# Patient Record
Sex: FEMALE | Race: WHITE | NOT HISPANIC OR LATINO | Employment: FULL TIME | URBAN - METROPOLITAN AREA
[De-identification: names, ages, dates, MRNs, and addresses within clinical notes are randomized per-mention and may not be internally consistent; named-entity substitution may affect disease eponyms.]

---

## 2023-07-28 LAB
ALANINE AMINOTRANSFERASE (SGPT) (U/L) IN SER/PLAS: 16 U/L (ref 7–45)
ALBUMIN (G/DL) IN SER/PLAS: 4.1 G/DL (ref 3.4–5)
ALKALINE PHOSPHATASE (U/L) IN SER/PLAS: 96 U/L (ref 33–110)
ANION GAP IN SER/PLAS: 12 MMOL/L (ref 10–20)
ASPARTATE AMINOTRANSFERASE (SGOT) (U/L) IN SER/PLAS: 13 U/L (ref 9–39)
BILIRUBIN TOTAL (MG/DL) IN SER/PLAS: 0.4 MG/DL (ref 0–1.2)
CALCIUM (MG/DL) IN SER/PLAS: 9.6 MG/DL (ref 8.6–10.3)
CARBON DIOXIDE, TOTAL (MMOL/L) IN SER/PLAS: 26 MMOL/L (ref 21–32)
CHLORIDE (MMOL/L) IN SER/PLAS: 103 MMOL/L (ref 98–107)
CHOLESTEROL (MG/DL) IN SER/PLAS: 168 MG/DL (ref 0–199)
CHOLESTEROL IN HDL (MG/DL) IN SER/PLAS: 40.5 MG/DL
CHOLESTEROL/HDL RATIO: 4.1
CREATININE (MG/DL) IN SER/PLAS: 0.79 MG/DL (ref 0.5–1.05)
GFR FEMALE: >90 ML/MIN/1.73M2
GLUCOSE (MG/DL) IN SER/PLAS: 97 MG/DL (ref 74–99)
LDL: 111 MG/DL (ref 0–99)
POTASSIUM (MMOL/L) IN SER/PLAS: 3.8 MMOL/L (ref 3.5–5.3)
PROTEIN TOTAL: 7.3 G/DL (ref 6.4–8.2)
SODIUM (MMOL/L) IN SER/PLAS: 137 MMOL/L (ref 136–145)
THYROTROPIN (MIU/L) IN SER/PLAS BY DETECTION LIMIT <= 0.05 MIU/L: 2.58 MIU/L (ref 0.44–3.98)
TRIGLYCERIDE (MG/DL) IN SER/PLAS: 84 MG/DL (ref 0–149)
UREA NITROGEN (MG/DL) IN SER/PLAS: 13 MG/DL (ref 6–23)
VLDL: 17 MG/DL (ref 0–40)

## 2023-07-29 LAB
ESTIMATED AVERAGE GLUCOSE FOR HBA1C: 108 MG/DL
HEMOGLOBIN A1C/HEMOGLOBIN TOTAL IN BLOOD: 5.4 %
INSULIN: 22 UIU/ML (ref 3–25)

## 2023-12-08 ENCOUNTER — TELEPHONE (OUTPATIENT)
Dept: ENDOCRINOLOGY | Facility: CLINIC | Age: 48
End: 2023-12-08
Payer: COMMERCIAL

## 2023-12-08 DIAGNOSIS — E03.9 HYPOTHYROIDISM, UNSPECIFIED TYPE: Primary | ICD-10-CM

## 2023-12-08 NOTE — TELEPHONE ENCOUNTER
Patient has a yearly thyroid appt. 12-15-23.  Please let me know what labs if any you want done.

## 2023-12-15 ENCOUNTER — APPOINTMENT (OUTPATIENT)
Dept: ENDOCRINOLOGY | Facility: CLINIC | Age: 48
End: 2023-12-15
Payer: COMMERCIAL

## 2024-05-07 ENCOUNTER — LAB (OUTPATIENT)
Dept: LAB | Facility: LAB | Age: 49
End: 2024-05-07
Payer: COMMERCIAL

## 2024-05-07 DIAGNOSIS — E03.9 HYPOTHYROIDISM, UNSPECIFIED TYPE: ICD-10-CM

## 2024-05-07 LAB — TSH SERPL-ACNC: 2.63 MIU/L (ref 0.44–3.98)

## 2024-05-07 PROCEDURE — 36415 COLL VENOUS BLD VENIPUNCTURE: CPT

## 2024-05-07 PROCEDURE — 84443 ASSAY THYROID STIM HORMONE: CPT

## 2024-05-08 ENCOUNTER — APPOINTMENT (OUTPATIENT)
Dept: ENDOCRINOLOGY | Facility: CLINIC | Age: 49
End: 2024-05-08
Payer: COMMERCIAL

## 2024-05-08 ENCOUNTER — OFFICE VISIT (OUTPATIENT)
Dept: ENDOCRINOLOGY | Facility: CLINIC | Age: 49
End: 2024-05-08
Payer: COMMERCIAL

## 2024-05-08 DIAGNOSIS — E55.9 VITAMIN D DEFICIENCY: ICD-10-CM

## 2024-05-08 DIAGNOSIS — E03.9 HYPOTHYROIDISM, UNSPECIFIED TYPE: Primary | ICD-10-CM

## 2024-05-08 RX ORDER — ERGOCALCIFEROL 1.25 MG/1
50000 CAPSULE ORAL
Qty: 12 CAPSULE | Refills: 2 | Status: SHIPPED | OUTPATIENT
Start: 2024-05-12 | End: 2025-01-19

## 2024-05-08 RX ORDER — LOSARTAN POTASSIUM 100 MG/1
TABLET ORAL
COMMUNITY

## 2024-05-08 RX ORDER — FUROSEMIDE 20 MG/1
1 TABLET ORAL DAILY
COMMUNITY
Start: 2022-07-08 | End: 2024-05-08 | Stop reason: ALTCHOICE

## 2024-05-08 RX ORDER — LEVOTHYROXINE SODIUM 50 UG/1
1 TABLET ORAL DAILY
COMMUNITY
Start: 2019-10-28 | End: 2024-05-08 | Stop reason: ALTCHOICE

## 2024-05-08 ASSESSMENT — ENCOUNTER SYMPTOMS
FREQUENCY: 0
SORE THROAT: 0
PHOTOPHOBIA: 0
TREMORS: 0
ABDOMINAL PAIN: 0
ARTHRALGIAS: 0
SHORTNESS OF BREATH: 0
SLEEP DISTURBANCE: 0
TROUBLE SWALLOWING: 0
DIARRHEA: 0
NERVOUS/ANXIOUS: 0
PALPITATIONS: 0
NAUSEA: 0
LIGHT-HEADEDNESS: 0
ABDOMINAL DISTENTION: 0
VOICE CHANGE: 0
FATIGUE: 1
DYSURIA: 0
HEADACHES: 0
EYE ITCHING: 0
CHEST TIGHTNESS: 0
CONSTIPATION: 0
AGITATION: 0
VOMITING: 0

## 2024-05-08 NOTE — PROGRESS NOTES
Subjective   Patient ID: Patricia Astudillo is a 48 y.o. female who presents for Hypothyroidism ( thyroid. Dx: 2018 /Taking thyroid med; correctly - levothyroxine 50mcg every day /[facility]: mother /).  Lab Results   Component Value Date    TSH 2.63 05/07/2024      HPI  See AP     Review of Systems   Constitutional:  Positive for fatigue.   HENT:  Negative for sore throat, trouble swallowing and voice change.    Eyes:  Negative for photophobia, itching and visual disturbance.   Respiratory:  Negative for chest tightness and shortness of breath.    Cardiovascular:  Negative for chest pain and palpitations.   Gastrointestinal:  Negative for abdominal distention, abdominal pain, constipation, diarrhea, nausea and vomiting.   Endocrine: Negative for cold intolerance, heat intolerance and polyuria.   Genitourinary:  Negative for dysuria and frequency.   Musculoskeletal:  Negative for arthralgias.   Skin:  Negative for pallor.   Allergic/Immunologic: Negative for environmental allergies.   Neurological:  Negative for tremors, light-headedness and headaches.   Psychiatric/Behavioral:  Negative for agitation and sleep disturbance. The patient is not nervous/anxious.        Objective   Physical Exam  Constitutional:       Appearance: Normal appearance.   HENT:      Head: Normocephalic.      Nose: Nose normal.      Mouth/Throat:      Mouth: Mucous membranes are moist.   Eyes:      Extraocular Movements: Extraocular movements intact.   Neck:      Comments: Thyroid enlarged ~ 25 gm , nodular, no discrete nodule palpated on exam     Cardiovascular:      Rate and Rhythm: Normal rate.   Pulmonary:      Effort: Pulmonary effort is normal. No respiratory distress.   Abdominal:      General: There is no distension.   Musculoskeletal:         General: Normal range of motion.      Cervical back: Normal range of motion and neck supple.   Skin:     General: Skin is warm and dry.   Neurological:      Mental Status: She is alert and oriented to  person, place, and time.   Psychiatric:         Mood and Affect: Mood normal.         Assessment/Plan   Diagnoses and all orders for this visit:  Hypothyroidism, unspecified type  -     TSH; Future  -     T4, free; Future  -     T3; Future  Vitamin D deficiency  -     ergocalciferol (Vitamin D-2) 1.25 MG (43523 UT) capsule; Take 1 capsule (50,000 Units) by mouth 1 (one) time per week.  -     Vitamin D 25-Hydroxy,Total (for eval of Vitamin D levels); Future       47 y/o F with Hashimoto's thyroiditis on replacement. Pt had twin girls on 10/26/19.- Ofelia      she has the twins via IVF and while getting workup she was found to have hypothyroidism and started on the levothyroxine.    She has not been taking any levothyroxine since few months and feels the same while she was on it   Does c/o fatigue     recent labs : TSH 2.63 TPO positive in past   clinically and biochemically euthyroid   Denies biotin use       PLAN   NO MORE LEVOTHYROXINE at this time.  Discussed symptoms of hypothyroidism.  Discussed in Hashimoto's thyroid disease we will have to monitor her symptoms and blood work   she mentions she has no plan for pregnancy at this time   TFT in 6 months    # Vitamin D deficiency: ergocalciferol 50,000 once weekly.  Will check levels next visit       RTC 1 yr      SH- twin girls 2019 : carlin   she works mon- thurs at [employer]      - major in [employer] ,   Mom- hypothyroidism

## 2024-06-17 ENCOUNTER — TELEPHONE (OUTPATIENT)
Dept: CARDIOLOGY | Facility: CLINIC | Age: 49
End: 2024-06-17
Payer: COMMERCIAL

## 2024-06-17 NOTE — TELEPHONE ENCOUNTER
Called pt to discuss. Pt currently admitted to [facility] in [place]. Informed pt it is recommended to follow direction and plan of care per care team at [facility]. Informed pt since she is currently under treatment of other physicians outside of our healthcare system, they should manage her current symptoms and perform any necessary testing because we cannot diagnose or assess over the phone. Informed pt we can obtain records of any labs and testing performed prior to ov with Dr. Edmonds. Instructed pt to call once discharged and I can discuss with Dr. Edmonds if a sooner appointment is needed - pt has ov and echo on 7/12/24. Pt verbalizes understanding of all instructions and information provided.

## 2024-06-17 NOTE — TELEPHONE ENCOUNTER
Good morning,  Patient is in [facility] with severe A-fib. They did a EKG on her, they plan on doing a ECHO. She said the Doctors told her they plan to do other test on her but she said that she is not sure if she has to stay there and do all that test since DR. Edmonds is her cardiologist. She preferred to be transferred to  and do all the has to be done instead to do in it at [facility]. Please call patient for advice at [phone].  Thanks  Taryn

## 2024-06-19 ENCOUNTER — PATIENT OUTREACH (OUTPATIENT)
Dept: PRIMARY CARE | Facility: CLINIC | Age: 49
End: 2024-06-19
Payer: COMMERCIAL

## 2024-06-19 NOTE — PROGRESS NOTES
Discharge Facility: [facility]  Discharge Diagnosis: Atrial fibrillation with rapid ventricular response   Admission Date: 6/16/24  Discharge Date:  6/18/24    PCP Appointment Date: Declined.  Specialist Appointment Date:   - Cardiologist  Hospital Encounter and Summary: Linked   See discharge assessment below for further details    Medications  Medications reviewed with patient/caregiver?: Yes (new/changes only) (6/19/2024 10:10 AM)  Is the patient having any side effects they believe may be caused by any medication additions or changes?: No (6/19/2024 10:10 AM)  Does the patient have all medications ordered at discharge?: Yes (6/19/2024 10:10 AM)  Care Management Interventions: No intervention needed (6/19/2024 10:10 AM)  Prescription Comments: START: eliquis 5mg BID, metoprolol tartrate 25mg BID  STOP: losartan (6/19/2024 10:10 AM)  Is the patient taking all medications as directed (includes completed medication regime)?: Yes (6/19/2024 10:10 AM)  Care Management Interventions: Provided patient education (6/19/2024 10:10 AM)    Appointments  Does the patient have a primary care provider?: Yes (6/19/2024 10:10 AM)  Care Management Interventions: Advised patient to make appointment (6/19/2024 10:10 AM)    Self Management  Has home health visited the patient within 72 hours of discharge?: Not applicable (6/19/2024 10:10 AM)  What Durable Medical Equipment (DME) was ordered?: n/a (6/19/2024 10:10 AM)    Patient Teaching  Does the patient have access to their discharge instructions?: Yes (6/19/2024 10:10 AM)  Care Management Interventions: Reviewed instructions with patient (6/19/2024 10:10 AM)  What is the patient's perception of their health status since discharge?: Improving (6/19/2024 10:10 AM)  Is the patient/caregiver able to teach back the hierarchy of who to call/visit for symptoms/problems? PCP, Specialist, Home Health nurse, Urgent Care, ED, 911: Yes (6/19/2024 10:10 AM)  Patient/Caregiver Education  Comments: Spoke with patient briefly for TCM outreach. Reviewed medication changes. Declines follow up with PCP at this time as she may be switching PCPs; will follow up with cardiology. Denies further questions/concerns at this time. (6/19/2024 10:10 AM)

## 2024-06-24 PROBLEM — I87.2 VENOUS INSUFFICIENCY: Status: ACTIVE | Noted: 2024-06-24

## 2024-06-24 PROBLEM — I48.91 ATRIAL FIBRILLATION WITH RAPID VENTRICULAR RESPONSE (MULTI): Status: ACTIVE | Noted: 2024-06-16

## 2024-06-24 PROBLEM — N91.1 AMENORRHEA, SECONDARY: Status: ACTIVE | Noted: 2024-06-24

## 2024-06-24 PROBLEM — R60.0 EDEMA OF LOWER EXTREMITY: Status: ACTIVE | Noted: 2024-06-24

## 2024-06-24 PROBLEM — D64.9 ANEMIA: Status: ACTIVE | Noted: 2024-06-24

## 2024-06-24 PROBLEM — E56.9 VITAMIN DEFICIENCY: Status: ACTIVE | Noted: 2024-06-24

## 2024-06-24 PROBLEM — G47.33 OBSTRUCTIVE SLEEP APNEA SYNDROME: Status: ACTIVE | Noted: 2017-03-21

## 2024-06-24 PROBLEM — F41.9 ANXIETY: Status: ACTIVE | Noted: 2024-06-24

## 2024-06-24 PROBLEM — E66.01 MORBID OBESITY (MULTI): Status: ACTIVE | Noted: 2024-06-24

## 2024-06-24 PROBLEM — F32.A DEPRESSION: Status: ACTIVE | Noted: 2024-06-24

## 2024-06-24 PROBLEM — R79.89 LOW VITAMIN D LEVEL: Status: ACTIVE | Noted: 2024-06-24

## 2024-06-24 PROBLEM — Z86.39 HISTORY OF HYPOTHYROIDISM: Status: ACTIVE | Noted: 2024-06-24

## 2024-06-24 PROBLEM — I10 HTN (HYPERTENSION): Status: ACTIVE | Noted: 2024-06-24

## 2024-06-24 PROBLEM — R63.2 POLYPHAGIA: Status: ACTIVE | Noted: 2024-06-24

## 2024-06-24 PROBLEM — I34.0 MITRAL REGURGITATION: Status: ACTIVE | Noted: 2024-06-24

## 2024-07-11 NOTE — PROGRESS NOTES
Subjective   Patricia Astudillo is a 49 y.o. female.    Chief Complaint:  Hospital follow-up for rapid atrial fibrillation.    HPI    She presented to [facility] on 2024 with atrial fibrillation with a rapid ventricular response.  She felt palpitations or racing heartbeat shortness of breath and chest heaviness.  In the emergency room she was in atrial fibrillation with a rapid ventricular response.  She was given IV Cardizem and spontaneously converted to sinus rhythm.  She was started on beta-blockers and on anticoagulation therapy.    An echocardiographic study performed on 2024 showed normal left ventricular function with left atrial enlargement and mitral valve prolapse.    Her cardiac history is significant for the presence of mitral regurgitation and mitral valve prolapse. She's also had a past history of an episode of atrial fibrillation. She's been on beta blocker therapy. This is maintaining her in sinus rhythm.     Past medical history: Significant for obstructive sleep apnea.  History of hypothyroidism.  History of arthritis.    Allergies  Medication    · Penicillins   Hives; Rash; Updated By: Paulette Acevedo; 2019 2:32:23 PM     Family History  Mother    · Family history of    · Family history of congestive heart failure (V17.49) (Z82.49)   · Family history of thyroid disease (V18.19) (Z83.49)  Father    · Family history of Coronary arteriosclerosis   · Family history of hypertension (V17.49) (Z82.49)     Social History  Problems    · Former smoker (V15.82) (Z87.891)   ·    · No illicit drug use   · Occupation   · non [employer]   · Rarely consumes alcohol (V49.89) (Z78.9)    Review of Systems   Constitutional: Positive for malaise/fatigue.   Cardiovascular:  Positive for dyspnea on exertion and palpitations.   Musculoskeletal:  Positive for arthritis.   All other systems reviewed and are negative.      Current Outpatient Medications   Medication Sig  "Dispense Refill    apixaban (Eliquis) 5 mg tablet Take 1 tablet (5 mg) by mouth 2 times a day.      ferrous sulfate, 325 mg ferrous sulfate, tablet Take 1 tablet by mouth once daily with breakfast. Takes twice a day.      metoprolol tartrate (Lopressor) 50 mg tablet Take by mouth 2 times a day.      losartan (Cozaar) 100 mg tablet TAKE 1 TABLET BY MOUTH BY MOUTH EVERY DAY      [START ON 7/14/2024] semaglutide (Ozempic) 0.25 mg or 0.5 mg(2 mg/1.5 mL) pen injector Inject 0.25 mg under the skin 1 (one) time per week. 1 each 1     No current facility-administered medications for this visit.        Visit Vitals  /90 (BP Location: Left arm)   Pulse 58   Ht 1.727 m (5' 8\")   Wt (!) 161 kg (355 lb)   SpO2 98%   BMI 53.98 kg/m²   Smoking Status Former   BSA 2.78 m²        Objective     Constitutional:       Appearance: Not in distress.   Neck:      Vascular: JVD normal.   Pulmonary:      Breath sounds: Normal breath sounds.   Cardiovascular:      Normal rate. Regular rhythm. Normal S1. Normal S2.       Murmurs: There is no murmur.      No gallop.    Pulses:     Intact distal pulses.   Edema:     Peripheral edema absent.   Abdominal:      General: There is no distension.      Palpations: Abdomen is soft.   Neurological:      Mental Status: Alert.         Lab Review:   Lab Results   Component Value Date     07/28/2023    K 3.8 07/28/2023     07/28/2023    CO2 26 07/28/2023    BUN 13 07/28/2023    CREATININE 0.79 07/28/2023    GLUCOSE 97 07/28/2023    CALCIUM 9.6 07/28/2023     Lab Results   Component Value Date    CHOL 168 07/28/2023    TRIG 84 07/28/2023    HDL 40.5 07/28/2023       Assessment:    1.  Paroxysmal atrial fibrillation.  Maintaining sinus rhythm.  We have asked her to get a [employer] mobile device to monitor her heart rates in case she has palpitations and tachycardia.  At that time we could then confirm whether she is in atrial fibrillation.  We discussed with Dr. Ramicone about a possible " ablation procedure.  We both feel she needs to lose weight before proceeding.    2.  Morbid obesity.  Has borderline diabetes mellitus.  Will start Ozempic.    3.  Obstructive sleep apnea.  Has not had any evaluation in a number of years.  Will refer to sleep apnea service at [facility].    4.  Lower extremity edema.  Primarily secondary to venous insufficiency.

## 2024-07-12 ENCOUNTER — APPOINTMENT (OUTPATIENT)
Dept: CARDIOLOGY | Facility: CLINIC | Age: 49
End: 2024-07-12
Payer: COMMERCIAL

## 2024-07-12 VITALS
DIASTOLIC BLOOD PRESSURE: 80 MMHG | SYSTOLIC BLOOD PRESSURE: 128 MMHG | BODY MASS INDEX: 44.41 KG/M2 | HEIGHT: 68 IN | WEIGHT: 293 LBS | HEART RATE: 58 BPM | OXYGEN SATURATION: 98 %

## 2024-07-12 DIAGNOSIS — I10 PRIMARY HYPERTENSION: ICD-10-CM

## 2024-07-12 DIAGNOSIS — G47.33 OBSTRUCTIVE SLEEP APNEA SYNDROME: ICD-10-CM

## 2024-07-12 DIAGNOSIS — I34.0 NONRHEUMATIC MITRAL VALVE REGURGITATION: ICD-10-CM

## 2024-07-12 DIAGNOSIS — E66.01 MORBID OBESITY (MULTI): ICD-10-CM

## 2024-07-12 DIAGNOSIS — I87.2 VENOUS INSUFFICIENCY: ICD-10-CM

## 2024-07-12 DIAGNOSIS — I48.91 ATRIAL FIBRILLATION WITH RAPID VENTRICULAR RESPONSE (MULTI): Primary | ICD-10-CM

## 2024-07-12 DIAGNOSIS — R60.0 EDEMA OF LOWER EXTREMITY: ICD-10-CM

## 2024-07-12 DIAGNOSIS — I48.91 ATRIAL FIBRILLATION WITH RAPID VENTRICULAR RESPONSE (MULTI): ICD-10-CM

## 2024-07-12 DIAGNOSIS — R73.03 BORDERLINE DIABETES MELLITUS: ICD-10-CM

## 2024-07-12 PROCEDURE — 3079F DIAST BP 80-89 MM HG: CPT | Performed by: INTERNAL MEDICINE

## 2024-07-12 PROCEDURE — 99214 OFFICE O/P EST MOD 30 MIN: CPT | Performed by: INTERNAL MEDICINE

## 2024-07-12 PROCEDURE — 1036F TOBACCO NON-USER: CPT | Performed by: INTERNAL MEDICINE

## 2024-07-12 PROCEDURE — 3074F SYST BP LT 130 MM HG: CPT | Performed by: INTERNAL MEDICINE

## 2024-07-12 RX ORDER — LOSARTAN POTASSIUM 100 MG/1
100 TABLET ORAL DAILY
Qty: 90 TABLET | Refills: 3 | Status: SHIPPED | OUTPATIENT
Start: 2024-07-12 | End: 2025-07-12

## 2024-07-12 RX ORDER — SEMAGLUTIDE 1.34 MG/ML
0.25 INJECTION, SOLUTION SUBCUTANEOUS
Qty: 1 EACH | Refills: 1 | Status: SHIPPED | OUTPATIENT
Start: 2024-07-14

## 2024-07-12 RX ORDER — METOPROLOL TARTRATE 50 MG/1
50 TABLET ORAL 2 TIMES DAILY
Qty: 180 TABLET | Refills: 3 | Status: SHIPPED | OUTPATIENT
Start: 2024-07-12 | End: 2025-07-12

## 2024-07-12 RX ORDER — FERROUS SULFATE 325(65) MG
325 TABLET ORAL
COMMUNITY

## 2024-07-12 RX ORDER — METOPROLOL TARTRATE 50 MG/1
TABLET ORAL 2 TIMES DAILY
COMMUNITY
End: 2024-07-12 | Stop reason: SDUPTHER

## 2024-07-12 ASSESSMENT — ENCOUNTER SYMPTOMS
DYSPNEA ON EXERTION: 1
PALPITATIONS: 1

## 2024-07-12 NOTE — PATIENT INSTRUCTIONS
Currently you are in a normal rhythm    We ultimately will want you to have a pulmonary vein isolation procedure.    Get a [employer] device to check your rhythm when you have symptoms.    Start Ozempic .25 mg shots once a week.    See Vanessa Barcenas CNP at [facility]  [phone]

## 2024-07-29 ENCOUNTER — PATIENT MESSAGE (OUTPATIENT)
Dept: CARDIOLOGY | Facility: CLINIC | Age: 49
End: 2024-07-29
Payer: COMMERCIAL

## 2024-08-05 DIAGNOSIS — E66.01 MORBID OBESITY (MULTI): ICD-10-CM

## 2024-08-05 DIAGNOSIS — I48.91 ATRIAL FIBRILLATION WITH RAPID VENTRICULAR RESPONSE (MULTI): ICD-10-CM

## 2024-08-05 DIAGNOSIS — R73.03 BORDERLINE DIABETES MELLITUS: ICD-10-CM

## 2024-08-05 RX ORDER — SEMAGLUTIDE 1.34 MG/ML
0.25 INJECTION, SOLUTION SUBCUTANEOUS
Qty: 1 EACH | Refills: 1 | Status: CANCELLED | OUTPATIENT
Start: 2024-08-05

## 2024-08-28 DIAGNOSIS — I10 HYPERTENSION, UNSPECIFIED TYPE: ICD-10-CM

## 2024-08-28 RX ORDER — SEMAGLUTIDE 0.25 MG/.5ML
0.25 INJECTION, SOLUTION SUBCUTANEOUS WEEKLY
Qty: 6 ML | Refills: 3 | Status: SHIPPED | OUTPATIENT
Start: 2024-08-28

## 2024-09-27 ENCOUNTER — PATIENT MESSAGE (OUTPATIENT)
Dept: CARDIOLOGY | Facility: CLINIC | Age: 49
End: 2024-09-27
Payer: COMMERCIAL

## 2024-10-01 DIAGNOSIS — E66.01 MORBID OBESITY (MULTI): ICD-10-CM

## 2024-10-01 DIAGNOSIS — R73.03 BORDERLINE DIABETES MELLITUS: ICD-10-CM

## 2024-10-01 RX ORDER — SEMAGLUTIDE 0.5 MG/.5ML
0.5 INJECTION, SOLUTION SUBCUTANEOUS
COMMUNITY
End: 2024-10-01 | Stop reason: ALTCHOICE

## 2024-10-01 RX ORDER — SEMAGLUTIDE 0.5 MG/.5ML
0.5 INJECTION, SOLUTION SUBCUTANEOUS
Qty: 4 ML | Refills: 2 | Status: SHIPPED | OUTPATIENT
Start: 2024-10-01

## 2024-10-07 NOTE — PROGRESS NOTES
Patient: Patricia Astudillo  : 1975 AGE: 49 y.o. SEX:female   MRN: [id number]   Provider: SONY Oates     Location [facility]   Service Date: 10/14/2024     PCP: Aisha Nieto PA-C   Referred by: No ref. provider found          [facility]  New Visit Note    HISTORY OF PRESENT ILLNESS     Patricia Astudillo is a 49 y.o. female with a h/o  SUSANNA, hypertension, A-fib, morbid obesity, anemia, anxiety, and depression  who presents to [facility].    10/14/2024: NPV with concerns of SUSANNA management. Patient was diagnosed with SUSANNA in  by PSG and was started on CPAP since then. Currently on CPAP 11? cm H2O and Resmed FFM mask. Received machine through [facility], has been purchasing supplies from [facility]. Patient has been using machine every night. Current machine - Resmed Escape, brought into office visit today.  Machine is very old, exceeded motor life, needs replacement. Unable to obtain full compliance download but machine shows 30/30 usage days with 8.6 hour usage/night. Patient denies mask leak, air hunger, aerophagia, dry mouth, skin irritation, or nasal congestion. Reports no snoring, better quality sleep, waking more refreshed with CPAP use. Patient would like to purchase replacement machine out of pocket then will get established with a local DME for supply replacement. ----> prescription for APAP 8-16 CWP to be purchased online     SLEEP STUDY HISTORY (personally reviewed raw data such as interpretation report, data sheet, hypnogram, and titration table if available and applicable)  -PSG at [facility] [place] 2017-showing moderate SUSANNA with AHI 22.4, SpO2 chioma 79%.    SLEEP-WAKE SCHEDULE    Sleep Patterns: She does have a usual bed partner. In terms of the patient's sleep/wake cycle, she generally gets into bed at approximately 10 PM.   her latency to sleep onset after lights out is quick. During the night,  the patient generally awakens 1-2 times nightly. These awakenings are usually brief in duration. Final wake time on weekday mornings is around 6 AM.    Compared to weekdays (work week), the patient's sleep schedule is  similar on the weekends (off work).     Breathing during sleep: snoring and witnessed apneas (without CPAP)  Behaviors at night: No   Sleep paralysis: No   Hypnogogic or hypnopompic hallucinations: No   Cataplexy: No     Leg symptoms and timing:  - Sensations: Patient does not have unusual sensations in their extremities that cause an urge to move them   - Movement: Patient has not been told that their legs kick or jerk during sleep    Daytime Symptoms:  On awakening patient reports: waking refreshed (with PAP)  Patient report some daytime symptoms including: DAYTIME SYMPTOMS: reports irritability during the day difficulty with memory or concentration during the day    Sleep environment:  Preferred sleep position: side  Room is dark: Yes  Room is quiet: Yes  Room is cool: Yes  Bed comfort: good    SLEEP HABITS  Caffeine consumption: Yes, 1-2 cups/day  Alcohol consumption: No  Smoking: No, quit 2006  Marijuana: No  Sleep aids: denies     WEIGHT: gained 47lbs weight gain since last sleep study 2017     ESS: 6  RIVKA: 6    REVIEW OF SYSTEMS     All other systems have been reviewed and are negative.    ALLERGIES     Allergies   Allergen Reactions    Tetanus Vaccines And Toxoid Other    Penicillins Unknown, Hives, Other and Rash     Unsure of reaction, had as a child     Unsure of reaction, had as a child       MEDICATIONS     Current Outpatient Medications   Medication Sig Dispense Refill    apixaban (Eliquis) 5 mg tablet Take 1 tablet (5 mg) by mouth 2 times a day. 180 tablet 3    ferrous sulfate, 325 mg ferrous sulfate, tablet Take 1 tablet (325 mg) by mouth once daily with breakfast. Takes twice a day.      losartan (Cozaar) 100 mg tablet Take 1 tablet (100 mg) by mouth once daily. 90 tablet 3     "metoprolol tartrate (Lopressor) 50 mg tablet Take 1 tablet by mouth 2 times a day. 180 tablet 3    semaglutide, weight loss, (Wegovy) 0.25 mg/0.5 mL pen injector Inject 0.25 mg under the skin 1 (one) time per week. 6 mL 3    semaglutide, weight loss, (Wegovy) 0.5 mg/0.5 mL pen injector Inject 0.5 mg under the skin every 7 days. 4 mL 2     No current facility-administered medications for this visit.       PAST HISTORIES     PERTINENT PAST MEDICAL HISTORY: See HPI    PERTINENT PAST SURGICAL HISTORY for Sleep Medicine:  non-contributory    PERTINENT FAMILY HISTORY for Sleep Medicine:  sleep apnea on PAP- mother () father, brother      PERTINENT SOCIAL HISTORY:  She  reports that she has quit smoking. Her smoking use included cigarettes. She has never used smokeless tobacco. She reports current alcohol use. She reports that she does not use drugs. She currently lives with spouse.    Active Problems, Allergy List, Medication List, and PMH/PSH/FH/Social Hx have been reviewed and reconciled in chart. No significant changes unless documented in the pertinent chart section. Updates made when necessary.     PHYSICAL EXAM     VITAL SIGNS: /80 (BP Location: Left arm, Patient Position: Sitting)   Pulse 65   Temp 36.1 °C (96.9 °F) (Temporal)   Ht 1.753 m (5' 9\")   Wt (!) 157 kg (347 lb)   SpO2 96%   BMI 51.24 kg/m²     CURRENT WEIGHT:   Vitals:    10/14/24 0825   Weight: (!) 157 kg (347 lb)      PREVIOUS WEIGHTS:  Wt Readings from Last 3 Encounters:   10/14/24 (!) 157 kg (347 lb)   24 (!) 159 kg (350 lb 1.5 oz)   24 (!) 161 kg (355 lb)     Physical Exam  Constitutional: Awake, not in distress  Skin: Warm, no rash  Neuro: No tremors, moves all extremities  Psych: alert and oriented to time, place, and person    RESULTS/DATA     Iron (ug/dL)   Date Value   2022 24 (L)     Iron Saturation (%)   Date Value   2022 5 (L)     TIBC (ug/dL)   Date Value   2022 457 (H)     Ferritin (ug/L) "   Date Value   12/13/2022 10       Bicarbonate   Date Value Ref Range Status   07/28/2023 26 21 - 32 mmol/L Final       ASSESSMENT/PLAN     Ms. Astudillo is a 49 y.o. female and She was referred to the [facility] for evaluation of SUSANNA    Problem List, Orders, Assessment, Recommendations:    #SUSANNA, moderate  -PSG at [facility] sleep center 4/14/2017-showing moderate SUSANNA with AHI 22.4, SpO2 chioma 79%.  - Retrieved and personally reviewed recent PAP adherence on Resmed machine   - Machine has exceeded motor life, needs replacement  - APAP 8-16 CWP prescription provided to be purchased online, bring machine to follow up visit in 6 weeks for compliance download and will get her established with local DME for supply replacements   - excellent compliance to PAP therapy and fair control of SUSANNA symptoms  - diet, exercise, and weight loss were emphasized today in clinic, as were non-supine sleep, avoiding alcohol in the late evening, and driving or operating heavy machinery when sleepy. Patient verbalized understanding.     #HTN/afib  BP Readings from Last 1 Encounters:   10/14/24 147/80     - doing well, asymptomatic, denies any headache, blurry vision, chest pain, palpitation, dizziness, lightheadedness, or syncopal episodes  - discussed at length the impact of untreated SUSANNA and BP control  - supportive management: low salt DASH diet (less than 2000 mg sodium intake daily), moderate intensity aerobic exercise at least 30 minutes 5 days per week, reduce stress, quit smoking, limit alcohol, lose weight, and monitor BP once daily  - On meds, OAT with Eliquis  - continue current management and follow-up with PCP     #Obesity  BMI Readings from Last 1 Encounters:   10/14/24 51.24 kg/m²     - Encouraged healthy weight loss via diet and exercise  - Weight loss can help in the long term treatment of SUSANNA.  - Defer management to PCP, on Wegovy    All of patient's questions were answered. She verbalizes  understanding and agreement with my assessment and plan.    Disposition    Return to clinic in 1.5 months    I personally spent 45 minutes today (exclusive of procedures) providing care for this patient, including preparation, face to face time, EMR documentation and other services such as review of medical records, diagnostic results, patient education, counseling, and coordination of care.

## 2024-10-14 ENCOUNTER — APPOINTMENT (OUTPATIENT)
Facility: CLINIC | Age: 49
End: 2024-10-14
Payer: COMMERCIAL

## 2024-10-14 VITALS
HEART RATE: 65 BPM | OXYGEN SATURATION: 96 % | BODY MASS INDEX: 43.4 KG/M2 | HEIGHT: 69 IN | TEMPERATURE: 96.9 F | WEIGHT: 293 LBS | SYSTOLIC BLOOD PRESSURE: 147 MMHG | DIASTOLIC BLOOD PRESSURE: 80 MMHG

## 2024-10-14 DIAGNOSIS — Z87.891 FORMER CIGARETTE SMOKER: ICD-10-CM

## 2024-10-14 DIAGNOSIS — E66.813 CLASS 3 SEVERE OBESITY DUE TO EXCESS CALORIES WITH SERIOUS COMORBIDITY AND BODY MASS INDEX (BMI) OF 50.0 TO 59.9 IN ADULT: ICD-10-CM

## 2024-10-14 DIAGNOSIS — G47.33 OSA (OBSTRUCTIVE SLEEP APNEA): ICD-10-CM

## 2024-10-14 DIAGNOSIS — I10 PRIMARY HYPERTENSION: ICD-10-CM

## 2024-10-14 DIAGNOSIS — E66.01 CLASS 3 SEVERE OBESITY DUE TO EXCESS CALORIES WITH SERIOUS COMORBIDITY AND BODY MASS INDEX (BMI) OF 50.0 TO 59.9 IN ADULT: ICD-10-CM

## 2024-10-14 DIAGNOSIS — G47.33 OBSTRUCTIVE SLEEP APNEA SYNDROME: Primary | ICD-10-CM

## 2024-10-14 DIAGNOSIS — I48.91 ATRIAL FIBRILLATION WITH RAPID VENTRICULAR RESPONSE (MULTI): ICD-10-CM

## 2024-10-14 PROCEDURE — 1036F TOBACCO NON-USER: CPT | Performed by: NURSE PRACTITIONER

## 2024-10-14 PROCEDURE — 3077F SYST BP >= 140 MM HG: CPT | Performed by: NURSE PRACTITIONER

## 2024-10-14 PROCEDURE — 99204 OFFICE O/P NEW MOD 45 MIN: CPT | Performed by: NURSE PRACTITIONER

## 2024-10-14 PROCEDURE — 3079F DIAST BP 80-89 MM HG: CPT | Performed by: NURSE PRACTITIONER

## 2024-10-14 PROCEDURE — 3008F BODY MASS INDEX DOCD: CPT | Performed by: NURSE PRACTITIONER

## 2024-10-14 PROCEDURE — 99214 OFFICE O/P EST MOD 30 MIN: CPT | Performed by: NURSE PRACTITIONER

## 2024-10-14 ASSESSMENT — SLEEP AND FATIGUE QUESTIONNAIRES
HOW LIKELY ARE YOU TO NOD OFF OR FALL ASLEEP WHILE WATCHING TV: SLIGHT CHANCE OF DOZING
DIFFICULTY_STAYING_ASLEEP: MILD
HOW LIKELY ARE YOU TO NOD OFF OR FALL ASLEEP WHILE LYING DOWN TO REST IN THE AFTERNOON WHEN CIRCUMSTANCES PERMIT: MODERATE CHANCE OF DOZING
ESS-CHAD TOTAL SCORE: 6
HOW LIKELY ARE YOU TO NOD OFF OR FALL ASLEEP WHILE SITTING AND TALKING TO SOMEONE: SLIGHT CHANCE OF DOZING
HOW LIKELY ARE YOU TO NOD OFF OR FALL ASLEEP WHILE SITTING QUIETLY AFTER LUNCH WITHOUT ALCOHOL: WOULD NEVER DOZE
SITING INACTIVE IN A PUBLIC PLACE LIKE A CLASS ROOM OR A MOVIE THEATER: WOULD NEVER DOZE
HOW LIKELY ARE YOU TO NOD OFF OR FALL ASLEEP IN A CAR, WHILE STOPPED FOR A FEW MINUTES IN TRAFFIC: SLIGHT CHANCE OF DOZING
HOW LIKELY ARE YOU TO NOD OFF OR FALL ASLEEP WHEN YOU ARE A PASSENGER IN A CAR FOR AN HOUR WITHOUT A BREAK: WOULD NEVER DOZE
SATISFACTION_WITH_CURRENT_SLEEP_PATTERN: SATISFIED
SLEEP_PROBLEM_NOTICEABLE_TO_OTHERS: A LITTLE
HOW LIKELY ARE YOU TO NOD OFF OR FALL ASLEEP WHILE SITTING AND READING: SLIGHT CHANCE OF DOZING
SLEEP_PROBLEM_INTERFERES_DAILY_ACTIVITIES: A LITTLE
WORRIED_DISTRESSED_DUE_TO_SLEEP: A LITTLE

## 2024-10-14 ASSESSMENT — PATIENT HEALTH QUESTIONNAIRE - PHQ9
1. LITTLE INTEREST OR PLEASURE IN DOING THINGS: NOT AT ALL
2. FEELING DOWN, DEPRESSED OR HOPELESS: NOT AT ALL
SUM OF ALL RESPONSES TO PHQ9 QUESTIONS 1 AND 2: 0

## 2024-10-14 NOTE — PATIENT INSTRUCTIONS
[facility] Sleep Medicine   [address] [facility]  [address] [address]       NAME: Patricia Astudillo   DATE: 10/14/24    Your Sleep Provider Today: SONY Oates  Your Primary Care Physician: Aisha Nieto PA-C       DIAGNOSIS:   1. SUSANNA (obstructive sleep apnea)        2. Obstructive sleep apnea syndrome        3. Primary hypertension        4. Atrial fibrillation with rapid ventricular response (Multi)            Thank you for coming to the Sleep Medicine Clinic today! Your sleep medicine provider today was: SONY Oates Below is a summary of your treatment plan, other important information, and our contact numbers:      TREATMENT PLAN     - Follow-up in 1.5 months.  - If not already done, sign up for 'My Chart' and send prescription requests or messages through this      IMPORTANT INFORMATION     Call 911 for medical emergencies.  Our offices are generally open from Monday-Friday, 9 am - 5 pm.  If you need to get in touch with me, you may either call me/my team (number is below) or you can use [employer].  If a referral for a test, for CPAP, or for another specialist was made, and you have not heard about scheduling this within a week, please call scheduling at [phone]).  If you are unable to make your appointment for clinic or an overnight study, kindly call the office at least 48 hours in advance to cancel and reschedule.  If you are on CPAP, please bring your device's card or the device to each clinic appointment.   There are no supporting services by either the sleep doctors or their staff on weekends and Holidays, or after 5 PM on weekdays.     PRESCRIPTIONS     We require 7 days advanced notice for prescription refills. If we do not receive the request in this time, we cannot guarantee that your medication will be refilled in time.      IMPORTANT PHONE NUMBERS     Behavioral Sleep Medicine: [phone]  Medical Service  "Company (DME): [phone]  "[employer] (DME): [phone]  [facility] (DME): [phone]    CONTACTING YOUR SLEEP MEDICINE PROVIDER AND SLEEP TEAM      For issues with your machine or mask interface, please call your DME provider first. DME stands for durable medical company. DME is the company who provides you the machine and/or PAP supplies / accessories.   To schedule, cancel, or reschedule SLEEP STUDY APPOINTMENTS, please call the Main Phone Line at [phone] - option 3.   To schedule, cancel, or reschedule CLINIC APPOINTMENTS, you can do it in \"[employer]\", call [phone] for [facility] office , [phone] for [facility] office to speak with my on site staff, or call the Main Phone Line at [phone] - option 2  For CLINICAL QUESTIONS or MEDICATION REFILLS, please call direct line for Adult Sleep Nurses at [phone].   Lastly, you can also send a message directly to your provider through \"My Chart\", which is the email service through your  Records Account: [email or web address]     Adult Sleep Nurses (Lainey Uribe, RN and Terri Liang RN):  For clinical questions and refilling prescriptions: [phone]  Email sleep diaries and other documents at: [email or web address]    Office locations for Vanessa Barcenas NP:    [address]  [place] [address] [phone]    [address]  [address]  [place], [place] [address] [phone]      [address] [facility]  [address]  [place], [place] [address] [phone]    OUR SLEEP TESTING LOCATIONS     Our team will contact you to schedule your sleep study, however, you can contact us as follow:  Main Phone Line (scheduling only): [phone]), option 3    Sleep Testing Locations:   [place] (18 years and older): [address] [place] [address], 2nd floor   [place] (18 years and older): [address] [facility]; 4th floor  After hours line: [phone]  [facility] " (18 years and older) at [place]: [address] [facility]  After hours line: [phone]   [facility] (Main campus: All ages): [facility], 6th floor. After hours line: [phone]   [place] (5 years and older; younger considered on case-by-case basis): [address] [place] [address]; [employer] [address], [address]. Scheduling  After hours line: [phone]       Here at [facility], we wish you a restful sleep!    Your sleep medicine provider for this visit was: Vanessa Barcenas, APRN-CNP

## 2024-10-17 ENCOUNTER — APPOINTMENT (OUTPATIENT)
Dept: CARDIOLOGY | Facility: CLINIC | Age: 49
End: 2024-10-17
Payer: COMMERCIAL

## 2024-10-17 VITALS
DIASTOLIC BLOOD PRESSURE: 80 MMHG | WEIGHT: 293 LBS | SYSTOLIC BLOOD PRESSURE: 136 MMHG | OXYGEN SATURATION: 98 % | HEART RATE: 74 BPM | BODY MASS INDEX: 43.4 KG/M2 | HEIGHT: 69 IN

## 2024-10-17 DIAGNOSIS — E66.01 CLASS 3 SEVERE OBESITY DUE TO EXCESS CALORIES WITH SERIOUS COMORBIDITY AND BODY MASS INDEX (BMI) OF 50.0 TO 59.9 IN ADULT: ICD-10-CM

## 2024-10-17 DIAGNOSIS — I87.2 VENOUS INSUFFICIENCY: ICD-10-CM

## 2024-10-17 DIAGNOSIS — I48.91 ATRIAL FIBRILLATION WITH RAPID VENTRICULAR RESPONSE (MULTI): ICD-10-CM

## 2024-10-17 DIAGNOSIS — I34.0 NONRHEUMATIC MITRAL VALVE REGURGITATION: ICD-10-CM

## 2024-10-17 DIAGNOSIS — I10 PRIMARY HYPERTENSION: Primary | ICD-10-CM

## 2024-10-17 DIAGNOSIS — R60.0 EDEMA OF LOWER EXTREMITY: ICD-10-CM

## 2024-10-17 DIAGNOSIS — E66.813 CLASS 3 SEVERE OBESITY DUE TO EXCESS CALORIES WITH SERIOUS COMORBIDITY AND BODY MASS INDEX (BMI) OF 50.0 TO 59.9 IN ADULT: ICD-10-CM

## 2024-10-17 DIAGNOSIS — E66.01 MORBID OBESITY (MULTI): ICD-10-CM

## 2024-10-17 PROCEDURE — 99214 OFFICE O/P EST MOD 30 MIN: CPT | Performed by: INTERNAL MEDICINE

## 2024-10-17 PROCEDURE — 1036F TOBACCO NON-USER: CPT | Performed by: INTERNAL MEDICINE

## 2024-10-17 PROCEDURE — 3008F BODY MASS INDEX DOCD: CPT | Performed by: INTERNAL MEDICINE

## 2024-10-17 PROCEDURE — 3079F DIAST BP 80-89 MM HG: CPT | Performed by: INTERNAL MEDICINE

## 2024-10-17 PROCEDURE — 3075F SYST BP GE 130 - 139MM HG: CPT | Performed by: INTERNAL MEDICINE

## 2024-10-17 RX ORDER — AZELASTINE 1 MG/ML
2 SPRAY, METERED NASAL 2 TIMES DAILY
COMMUNITY
Start: 2024-09-03

## 2024-10-17 RX ORDER — SEMAGLUTIDE 0.5 MG/.5ML
1 INJECTION, SOLUTION SUBCUTANEOUS
Qty: 4 ML | Refills: 3 | Status: SHIPPED | OUTPATIENT
Start: 2024-10-17

## 2024-10-17 RX ORDER — TORSEMIDE 5 MG/1
5 TABLET ORAL DAILY
Qty: 30 TABLET | Refills: 11 | Status: SHIPPED | OUTPATIENT
Start: 2024-10-17 | End: 2025-10-17

## 2024-10-17 NOTE — PROGRESS NOTES
Subjective   Patricia Astudillo is a 49 y.o. female.    Chief Complaint:  Exertional dyspnea.  Leg edema.  Follow-up for weight loss.    HPI    Any episodes of palpitations or tachycardia.  When she went into atrial fibrillation she was quite symptomatic.  Has not had any recurrence.  Started on Wegovy.  Tolerating the medications well.  Does have chronic lower extremity edema.    he presented to [facility] on 2024 with atrial fibrillation with a rapid ventricular response.  She was given IV Cardizem and spontaneously converted to sinus rhythm.      An echocardiographic study performed on 2024 showed normal left ventricular function with left atrial enlargement and mitral valve prolapse.     Her cardiac history is significant for the presence of mitral regurgitation and mitral valve prolapse. She's also had a past history of an episode of atrial fibrillation. She's been on beta blocker therapy.      Past medical history: Significant for obstructive sleep apnea.  History of hypothyroidism.  History of arthritis.     Allergies  Medication    · Penicillins   Hives; Rash; Updated By: Paulette Acevedo; 2019 2:32:23 PM     Family History  Mother    · Family history of    · Family history of congestive heart failure (V17.49) (Z82.49)   · Family history of thyroid disease (V18.19) (Z83.49)  Father    · Family history of Coronary arteriosclerosis   · Family history of hypertension (V17.49) (Z82.49)     Social History  Problems    · Former smoker (V15.82) (Z87.891)   ·    · No illicit drug use   · Occupation   · non [employer]   · Rarely consumes alcohol (V49.89) (Z78.9)     Review of Systems   Constitutional: Positive for malaise/fatigue.   Cardiovascular:  Positive for dyspnea on exertion and palpitations.  Lower extremity edema.  Musculoskeletal:  Positive for arthritis.   All other systems reviewed and are negative.      Current Outpatient Medications   Medication Sig  "Dispense Refill    apixaban (Eliquis) 5 mg tablet Take 1 tablet (5 mg) by mouth 2 times a day. 180 tablet 3    azelastine (Astelin) 137 mcg (0.1 %) nasal spray Administer 2 sprays into each nostril 2 times a day.      ferrous sulfate, 325 mg ferrous sulfate, tablet Take 1 tablet (325 mg) by mouth once daily with breakfast. Takes twice a day.      losartan (Cozaar) 100 mg tablet Take 1 tablet (100 mg) by mouth once daily. 90 tablet 3    metoprolol tartrate (Lopressor) 50 mg tablet Take 1 tablet by mouth 2 times a day. 180 tablet 3    semaglutide, weight loss, (Wegovy) 0.5 mg/0.5 mL pen injector Inject 1 mg under the skin every 7 days. 4 mL 3    torsemide (Demadex) 5 mg tablet Take 1 tablet (5 mg) by mouth once daily. 30 tablet 11     No current facility-administered medications for this visit.        Visit Vitals  /80 (BP Location: Left arm)   Pulse 74   Ht 1.753 m (5' 9\")   Wt (!) 159 kg (351 lb)   SpO2 98%   BMI 51.83 kg/m²   Smoking Status Former   BSA 2.78 m²        Objective     Constitutional:       Appearance: Not in distress.   Neck:      Vascular: JVD normal.   Pulmonary:      Breath sounds: Normal breath sounds.   Cardiovascular:      Normal rate. Regular rhythm. Normal S1. Normal S2.       Murmurs: There is no murmur.      No gallop.    Pulses:     Intact distal pulses.   Edema:     Peripheral edema present.     Pretibial: bilateral 1+ edema of the pretibial area.     Ankle: bilateral 1+ edema of the ankle.  Abdominal:      General: There is no distension.      Palpations: Abdomen is soft.   Neurological:      Mental Status: Alert.         Lab Review:   Lab Results   Component Value Date     07/28/2023    K 3.8 07/28/2023     07/28/2023    CO2 26 07/28/2023    BUN 13 07/28/2023    CREATININE 0.79 07/28/2023    GLUCOSE 97 07/28/2023    CALCIUM 9.6 07/28/2023       Assessment:    1.  Paroxysmal atrial fibrillation.  Continues to maintain sinus rhythm.  On beta-blockers and anticoagulation.  " High risk for reoccurrence of atrial fibrillation.    2.  Valvular heart disease.  Mitral regurgitation and mitral valve prolapse.  Will repeat echo study.    3.  Morbid obesity.  On Wegovy will increase the dose to 1 mg daily.    4.  Obstructive sleep apnea.  Evaluation in progress.    5.  Lower extremity edema.  Will start torsemide.

## 2024-10-25 ENCOUNTER — APPOINTMENT (OUTPATIENT)
Dept: CARDIOLOGY | Facility: CLINIC | Age: 49
End: 2024-10-25
Payer: COMMERCIAL

## 2024-10-25 DIAGNOSIS — E66.01 MORBID OBESITY (MULTI): ICD-10-CM

## 2024-10-25 RX ORDER — SEMAGLUTIDE 1 MG/.5ML
1 INJECTION, SOLUTION SUBCUTANEOUS
COMMUNITY
End: 2024-10-25 | Stop reason: SDUPTHER

## 2024-10-26 RX ORDER — SEMAGLUTIDE 1 MG/.5ML
1 INJECTION, SOLUTION SUBCUTANEOUS
Qty: 1 ML | Refills: 3 | Status: SHIPPED | OUTPATIENT
Start: 2024-10-26

## 2024-11-07 ENCOUNTER — LAB (OUTPATIENT)
Dept: LAB | Facility: LAB | Age: 49
End: 2024-11-07
Payer: COMMERCIAL

## 2024-11-07 DIAGNOSIS — E55.9 VITAMIN D DEFICIENCY: ICD-10-CM

## 2024-11-07 DIAGNOSIS — E03.9 HYPOTHYROIDISM, UNSPECIFIED TYPE: ICD-10-CM

## 2024-11-07 LAB
25(OH)D3 SERPL-MCNC: 12 NG/ML (ref 30–100)
T3 SERPL-MCNC: 89 NG/DL (ref 60–200)
T4 FREE SERPL-MCNC: 0.76 NG/DL (ref 0.61–1.12)
TSH SERPL-ACNC: 1.6 MIU/L (ref 0.44–3.98)

## 2024-11-07 PROCEDURE — 84480 ASSAY TRIIODOTHYRONINE (T3): CPT

## 2024-11-07 PROCEDURE — 36415 COLL VENOUS BLD VENIPUNCTURE: CPT

## 2024-11-07 PROCEDURE — 84439 ASSAY OF FREE THYROXINE: CPT

## 2024-11-07 PROCEDURE — 82306 VITAMIN D 25 HYDROXY: CPT

## 2024-11-07 PROCEDURE — 84443 ASSAY THYROID STIM HORMONE: CPT

## 2024-11-08 ENCOUNTER — APPOINTMENT (OUTPATIENT)
Dept: ENDOCRINOLOGY | Facility: CLINIC | Age: 49
End: 2024-11-08
Payer: COMMERCIAL

## 2024-11-08 DIAGNOSIS — Z86.39 HISTORY OF HYPOTHYROIDISM: Primary | ICD-10-CM

## 2024-11-08 DIAGNOSIS — E66.01 MORBID OBESITY (MULTI): ICD-10-CM

## 2024-11-08 PROCEDURE — 99214 OFFICE O/P EST MOD 30 MIN: CPT | Performed by: HOSPITALIST

## 2024-11-08 ASSESSMENT — ENCOUNTER SYMPTOMS
CHEST TIGHTNESS: 0
PHOTOPHOBIA: 0
TREMORS: 0
LIGHT-HEADEDNESS: 0
NERVOUS/ANXIOUS: 0
ABDOMINAL PAIN: 0
TROUBLE SWALLOWING: 0
SLEEP DISTURBANCE: 0
NAUSEA: 0
DIARRHEA: 0
FREQUENCY: 0
SHORTNESS OF BREATH: 0
VOICE CHANGE: 0
ARTHRALGIAS: 0
DYSURIA: 0
PALPITATIONS: 0
CONSTIPATION: 0
HEADACHES: 0
CONSTITUTIONAL NEGATIVE: 1
EYE ITCHING: 0
VOMITING: 0
ABDOMINAL DISTENTION: 0
SORE THROAT: 0
AGITATION: 0

## 2024-11-08 NOTE — PROGRESS NOTES
Subjective   Patient ID: Patricia Astudillo is a 49 y.o. female who presents for Hypothyroidism (VIRTUAL VISIT:: / Dx: 2018 /[facility] thyroid: mother /PCP: Emilia /NO thyroid replacement ).  Lab Results   Component Value Date    TSH 1.60 11/07/2024      HPI   See AP     Review of Systems   Constitutional: Negative.    HENT:  Negative for sore throat, trouble swallowing and voice change.    Eyes:  Negative for photophobia, itching and visual disturbance.   Respiratory:  Negative for chest tightness and shortness of breath.    Cardiovascular:  Negative for chest pain and palpitations.   Gastrointestinal:  Negative for abdominal distention, abdominal pain, constipation, diarrhea, nausea and vomiting.   Endocrine: Negative for cold intolerance, heat intolerance and polyuria.   Genitourinary:  Negative for dysuria and frequency.   Musculoskeletal:  Negative for arthralgias.   Skin:  Negative for pallor.   Allergic/Immunologic: Negative for environmental allergies.   Neurological:  Negative for tremors, light-headedness and headaches.   Psychiatric/Behavioral:  Negative for agitation and sleep disturbance. The patient is not nervous/anxious.        Objective   Physical Exam NO vitals due to virtual visit      Nad   AAOx3    MMM   \ EOM nml   Mood appropriate     Assessment/Plan   Diagnoses and all orders for this visit:  History of hypothyroidism  Morbid obesity (Multi)         50 y/o F with Hashimoto's thyroiditis on replacement. Pt had twin girls on 10/26/19.- Ofelia      she has the twins via IVF and while getting workup she was found to have hypothyroidism and started on the levothyroxine.    She has not been taking any levothyroxine early 2024 and feels the same   Does c/o fatigue     5/ 2024 TSH 2.63 TPO positive in past  11/7/2024 TSH 1.60   clinically and biochemically euthyroid   Denies biotin use     PLAN:    NO MORE LEVOTHYROXINE at this time.  Discussed symptoms of hypothyroidism.  Discussed in  Hashimoto's thyroid disease we will have to monitor her symptoms and blood work   she mentions she has no plan for pregnancy at this time   TFT in 6 months    # Vitamin D deficiency: ergocalciferol 50,000 once weekly.  Will check levels next visit     # Morbid obesity : currently on wegovy , getting from Cardiologist. Had afib in past.     RTC  6m       SH- twin girls 2019 : carlin - 4 yo   she works mon- thurs at [employer]      - major in [employer] physics ,   Mom- hypothyroidism

## 2024-11-18 ENCOUNTER — APPOINTMENT (OUTPATIENT)
Dept: SLEEP MEDICINE | Facility: CLINIC | Age: 49
End: 2024-11-18
Payer: COMMERCIAL

## 2024-11-22 NOTE — PROGRESS NOTES
Patient: Patricia Astudillo  : 1975 AGE: 49 y.o. SEX:female   MRN: [id number]   Provider: SONY Oates     Location [facility]   Service Date: 2024     PCP: Aisha Nieto PA-C   Referred by: No ref. provider found          [facility]  Follow Up Visit Note    HISTORY OF PRESENT ILLNESS     Patricia Astudillo is a 49 y.o. female with a h/o  SUSANNA, hypertension, A-fib, morbid obesity, anemia, anxiety, and depression  who presents to [facility].    24: First follow up since receiving new AirSense 10 machine she purchased online. Brought machine into office visit today for DL. Using every night with positive benefit. Can not sleep without CPAP. Reports he is comfortable with current pressure and Arifit F20 medium mask fit. Denies any bothersome symptoms or air leak. Denies any nasal symptoms on CPAP including rhinorrhea, nasal congestion, postnasal drip or sinusitis. Reports no snoring, better quality sleep, and more refreshing sleep in comparison to before starting APAP. Typical sleep schedule 10:00pm- 6am. Does not nap. Denies difficulty falling asleep, staying asleep. ---> Supplies ordered via [employer]        Media Information        10/14/2024: NPV with concerns of SUSANNA management. Patient was diagnosed with SUSANNA in  by PSG and was started on CPAP since then. Currently on CPAP 11? cm H2O and Resmed FFM mask. Received machine through [employer] has been purchasing supplies from [employer]. Patient has been using machine every night. Current machine - Resmed Escape, brought into office visit today.  Machine is very old, exceeded motor life, needs replacement. Unable to obtain full compliance download but machine shows 30/30 usage days with 8.6 hour usage/night. Patient denies mask leak, air hunger, aerophagia, dry mouth, skin irritation, or nasal congestion. Reports no snoring, better quality sleep, waking more  refreshed with CPAP use. Patient would like to purchase replacement machine out of pocket then will get established with a local DME for supply replacement. ----> prescription for APAP 8-16 CWP to be purchased online     SLEEP STUDY HISTORY (personally reviewed raw data such as interpretation report, data sheet, hypnogram, and titration table if available and applicable)  -PSG at [facility] 4/14/2017-showing moderate SUSANNA with AHI 22.4, SpO2 chioma 79%.    SLEEP-WAKE SCHEDULE    Sleep Patterns: She does have a usual bed partner. In terms of the patient's sleep/wake cycle, she generally gets into bed at approximately 10 PM.   her latency to sleep onset after lights out is quick. During the night, the patient generally awakens 1-2 times nightly. These awakenings are usually brief in duration. Final wake time on weekday mornings is around 6 AM.    Compared to weekdays (work week), the patient's sleep schedule is  similar on the weekends (off work).     Breathing during sleep: snoring and witnessed apneas (without CPAP)  Behaviors at night: No   Sleep paralysis: No   Hypnogogic or hypnopompic hallucinations: No   Cataplexy: No     Leg symptoms and timing:  - Sensations: Patient does not have unusual sensations in their extremities that cause an urge to move them   - Movement: Patient has not been told that their legs kick or jerk during sleep    Daytime Symptoms:  On awakening patient reports: waking refreshed (with PAP)  Patient report some daytime symptoms including: DAYTIME SYMPTOMS: reports irritability during the day difficulty with memory or concentration during the day    Sleep environment:  Preferred sleep position: side  Room is dark: Yes  Room is quiet: Yes  Room is cool: Yes  Bed comfort: good    SLEEP HABITS  Caffeine consumption: Yes, 1-2 cups/day  Alcohol consumption: No  Smoking: No, quit 2006  Marijuana: No  Sleep aids: denies     WEIGHT: gained 47lbs weight gain since last sleep study 2017      ESS: 6    REVIEW OF SYSTEMS     All other systems have been reviewed and are negative.    ALLERGIES     Allergies   Allergen Reactions    Tetanus Vaccines And Toxoid Other    Penicillins Unknown, Hives, Other and Rash     Unsure of reaction, had as a child     Unsure of reaction, had as a child       MEDICATIONS     Current Outpatient Medications   Medication Sig Dispense Refill    apixaban (Eliquis) 5 mg tablet Take 1 tablet (5 mg) by mouth 2 times a day. 180 tablet 3    azelastine (Astelin) 137 mcg (0.1 %) nasal spray Administer 2 sprays into each nostril 2 times a day.      ferrous sulfate, 325 mg ferrous sulfate, tablet Take 1 tablet (325 mg) by mouth once daily with breakfast. Takes twice a day.      losartan (Cozaar) 100 mg tablet Take 1 tablet (100 mg) by mouth once daily. 90 tablet 3    metoprolol tartrate (Lopressor) 50 mg tablet Take 1 tablet by mouth 2 times a day. 180 tablet 3    torsemide (Demadex) 5 mg tablet Take 1 tablet (5 mg) by mouth once daily. (Patient taking differently: Take 1 tablet (5 mg) by mouth every other day.) 30 tablet 11    semaglutide, weight loss, (Wegovy) 0.5 mg/0.5 mL pen injector Inject 1 mg under the skin every 7 days. (Patient not taking: Reported on 2024) 4 mL 3    semaglutide, weight loss, (Wegovy) 1 mg/0.5 mL pen injector Inject 1 mg under the skin every 7 days. (Patient not taking: Reported on 2024) 1 mL 3     No current facility-administered medications for this visit.       PAST HISTORIES     PERTINENT PAST MEDICAL HISTORY: See HPI    PERTINENT PAST SURGICAL HISTORY for Sleep Medicine:  non-contributory    PERTINENT FAMILY HISTORY for Sleep Medicine:  sleep apnea on PAP- mother () father, brother      PERTINENT SOCIAL HISTORY:  She  reports that she has quit smoking. Her smoking use included cigarettes. She has never used smokeless tobacco. She reports current alcohol use. She reports that she does not use drugs. She currently lives with  "spouse.    Active Problems, Allergy List, Medication List, and PMH/PSH/FH/Social Hx have been reviewed and reconciled in chart. No significant changes unless documented in the pertinent chart section. Updates made when necessary.     PHYSICAL EXAM     VITAL SIGNS: BP (!) 157/94   Pulse 77   Resp 16   Ht 1.753 m (5' 9\")   Wt (!) 153 kg (338 lb)   SpO2 97%   BMI 49.91 kg/m²     CURRENT WEIGHT:   Vitals:    12/02/24 0839   Weight: (!) 153 kg (338 lb)     PREVIOUS WEIGHTS:  Wt Readings from Last 3 Encounters:   12/02/24 (!) 153 kg (338 lb)   10/17/24 (!) 159 kg (351 lb)   10/14/24 (!) 157 kg (347 lb)     Physical Exam  Constitutional: Awake, not in distress  Skin: Warm, no rash  Neuro: No tremors, moves all extremities  Psych: alert and oriented to time, place, and person    RESULTS/DATA     Iron (ug/dL)   Date Value   12/13/2022 24 (L)     Iron Saturation (%)   Date Value   12/13/2022 5 (L)     TIBC (ug/dL)   Date Value   12/13/2022 457 (H)     Ferritin (ug/L)   Date Value   12/13/2022 10       Bicarbonate   Date Value Ref Range Status   07/28/2023 26 21 - 32 mmol/L Final       ASSESSMENT/PLAN     Ms. Astudillo is a 49 y.o. female and She was referred to the [facility] for evaluation of SUSANNA    Problem List, Orders, Assessment, Recommendations:    #SUSANNA, moderate  - PSG at [facility] [place] 4/14/2017-showing moderate SUSANNA with AHI 22.4, SpO2 chioma 79%.  - Has AirSense 10 that she purchased online (Oct 2024)  - Retrieved and personally reviewed recent PAP adherence download data today. See HPI.  - excellent compliance to PAP therapy, residual AHI at goal (4.0), and good control of SUSANNA symptoms  - continue current setting 4-20 CWP   -will get her established with local DME for supply replacements, order placed to DME- [facility]   - diet, exercise, and weight loss were emphasized today in clinic, as were non-supine sleep, avoiding alcohol in the late evening, and driving or operating heavy " machinery when sleepy. Patient verbalized understanding.     #HTN/afib  BP Readings from Last 1 Encounters:   12/02/24 (!) 157/94     - doing well, asymptomatic, denies any headache, blurry vision, chest pain, palpitation, dizziness, lightheadedness, or syncopal episodes  - discussed at length the impact of untreated SUSANNA and BP control  - supportive management: low salt DASH diet (less than 2000 mg sodium intake daily), moderate intensity aerobic exercise at least 30 minutes 5 days per week, reduce stress, quit smoking, limit alcohol, lose weight, and monitor BP once daily  - On meds, OAT with Eliquis  - continue current management and follow-up with PCP     #Obesity  BMI Readings from Last 1 Encounters:   12/02/24 49.91 kg/m²     - Encouraged healthy weight loss via diet and exercise  - Weight loss can help in the long term treatment of SUSANNA.  - Defer management to PCP, on Wegovy    All of patient's questions were answered. She verbalizes understanding and agreement with my assessment and plan.    Disposition    Return to clinic in 12 months    I personally spent 30 minutes today (exclusive of procedures) providing care for this patient, including preparation, face to face time, EMR documentation and other services such as review of medical records, diagnostic results, patient education, counseling, and coordination of care.

## 2024-11-25 ENCOUNTER — TELEPHONE (OUTPATIENT)
Dept: CARDIOLOGY | Facility: CLINIC | Age: 49
End: 2024-11-25
Payer: COMMERCIAL

## 2024-11-25 NOTE — TELEPHONE ENCOUNTER
Answering service message from 11/22/24  @  4:51pm : Was speaking with Dr. Edmonds's nurse about changing one of her medications and asked for a call back. She said she left a message on [employer] with specific information and codes.

## 2024-12-01 PROBLEM — G47.33 OSA (OBSTRUCTIVE SLEEP APNEA): Status: ACTIVE | Noted: 2024-12-01

## 2024-12-02 ENCOUNTER — APPOINTMENT (OUTPATIENT)
Facility: CLINIC | Age: 49
End: 2024-12-02
Payer: COMMERCIAL

## 2024-12-02 VITALS
OXYGEN SATURATION: 97 % | WEIGHT: 293 LBS | HEART RATE: 77 BPM | HEIGHT: 69 IN | SYSTOLIC BLOOD PRESSURE: 157 MMHG | BODY MASS INDEX: 43.4 KG/M2 | DIASTOLIC BLOOD PRESSURE: 94 MMHG | RESPIRATION RATE: 16 BRPM

## 2024-12-02 DIAGNOSIS — G47.33 OSA (OBSTRUCTIVE SLEEP APNEA): Primary | ICD-10-CM

## 2024-12-02 DIAGNOSIS — I10 PRIMARY HYPERTENSION: ICD-10-CM

## 2024-12-02 DIAGNOSIS — E66.813 CLASS 3 SEVERE OBESITY DUE TO EXCESS CALORIES WITH SERIOUS COMORBIDITY AND BODY MASS INDEX (BMI) OF 45.0 TO 49.9 IN ADULT: ICD-10-CM

## 2024-12-02 DIAGNOSIS — E66.01 CLASS 3 SEVERE OBESITY DUE TO EXCESS CALORIES WITH SERIOUS COMORBIDITY AND BODY MASS INDEX (BMI) OF 45.0 TO 49.9 IN ADULT: ICD-10-CM

## 2024-12-02 PROCEDURE — 3080F DIAST BP >= 90 MM HG: CPT | Performed by: NURSE PRACTITIONER

## 2024-12-02 PROCEDURE — 99214 OFFICE O/P EST MOD 30 MIN: CPT | Performed by: NURSE PRACTITIONER

## 2024-12-02 PROCEDURE — 3008F BODY MASS INDEX DOCD: CPT | Performed by: NURSE PRACTITIONER

## 2024-12-02 PROCEDURE — 1036F TOBACCO NON-USER: CPT | Performed by: NURSE PRACTITIONER

## 2024-12-02 PROCEDURE — 3077F SYST BP >= 140 MM HG: CPT | Performed by: NURSE PRACTITIONER

## 2024-12-02 ASSESSMENT — SLEEP AND FATIGUE QUESTIONNAIRES
HOW LIKELY ARE YOU TO NOD OFF OR FALL ASLEEP IN A CAR, WHILE STOPPED FOR A FEW MINUTES IN TRAFFIC: WOULD NEVER DOZE
HOW LIKELY ARE YOU TO NOD OFF OR FALL ASLEEP WHILE WATCHING TV: SLIGHT CHANCE OF DOZING
HOW LIKELY ARE YOU TO NOD OFF OR FALL ASLEEP WHILE SITTING AND TALKING TO SOMEONE: WOULD NEVER DOZE
SITING INACTIVE IN A PUBLIC PLACE LIKE A CLASS ROOM OR A MOVIE THEATER: SLIGHT CHANCE OF DOZING
HOW LIKELY ARE YOU TO NOD OFF OR FALL ASLEEP WHILE SITTING QUIETLY AFTER LUNCH WITHOUT ALCOHOL: SLIGHT CHANCE OF DOZING
HOW LIKELY ARE YOU TO NOD OFF OR FALL ASLEEP WHILE LYING DOWN TO REST IN THE AFTERNOON WHEN CIRCUMSTANCES PERMIT: SLIGHT CHANCE OF DOZING
HOW LIKELY ARE YOU TO NOD OFF OR FALL ASLEEP WHILE SITTING AND READING: SLIGHT CHANCE OF DOZING
ESS-CHAD TOTAL SCORE: 6
HOW LIKELY ARE YOU TO NOD OFF OR FALL ASLEEP WHEN YOU ARE A PASSENGER IN A CAR FOR AN HOUR WITHOUT A BREAK: SLIGHT CHANCE OF DOZING

## 2024-12-02 NOTE — PATIENT INSTRUCTIONS
[facility]   [facility]  [address] [address]       NAME: Patricia Astudillo   DATE: 12/02/24    Your Sleep Provider Today: SONY Oates  Your Primary Care Physician: Aisha Nieto PA-C       DIAGNOSIS:   1. SUSANNA (obstructive sleep apnea)            Thank you for coming to the Sleep Medicine Clinic today! Your sleep medicine provider today was: SONY Oates Below is a summary of your treatment plan, other important information, and our contact numbers:      TREATMENT PLAN     - Follow-up in 12 months.  - If not already done, sign up for 'My Chart' and send prescription requests or messages through this  - Supplies ordered through [employer]     Annual Reminders About Your Sleep Apnea    Your sleep apnea is well controlled based on reviewing your PAP Data Report. A supply renewal prescription has been sent to your DME company.     General Reminders:  Continue current machine settings. Continue using machine every night. Need at least 4 hours daily usage.   Remember to clean your mask, tubings, and water chamber regularly as instructed.  Know the replacement schedule of your supplies/ accessories and contact your DME (durable medical equipment) provider if you are due for them.  Avoid driving or operating heavy machinery when drowsy. A person driving while sleepy is 5 times more likely to have an accident. If you feel sleepy, pull over and take a short power nap (sleep for less than 30 minutes). Otherwise, ask somebody to drive you.    Follow-[facility] sooner through [facility] or calling our office if you have any of the following symptoms:  Snoring or stopping breathing while using the machine  Recurrence of fatigue, sleepiness, insomnia, and other symptoms you had prior using machine  Persistent or worsening fatigue or sleepiness despite regular use of machine  Issues tolerating the machine like bloating  sensation, air hunger, too much hot air, too much pressure, taking off mask without recall in the middle of sleep, etc.     Other conservative measures to improve sleep apnea:  Losing weight can lead to some improvement of SUSANNA which means you will need lower pressures in machine to control your SUSANNA. In some patients, they don't need to use the machine after bariatric surgery. Hence, consider medical and/or surgical weight loss especially if your BMI is more than 35.  Avoiding alcohol, sedative-hypnotics, and sedating medications is imperative as these substances can worsen snoring and sleep apnea  If you have nasal congestion or seasonal allergies, improving your breathing through the nose is critical for treating sleep apnea, tolerating CPAP, and improving your sleep; hence, using intranasal steroid spray like Flonase might help. Make sure you know the proper way to use it.  Stay off your back when sleeping.    Common issues with PAP machine:  Mask gets dislodged when turning to the side: Consider getting a CPAP pillow or switching to a mask with hose on top.     Dry mouth:  Your machine has built-in humidifier that heats up the air to prevent dry mouth. It can be adjusted to your comfort. You can try that first and increase setting one level one night at a time to check which setting is comfortable and effective in lessening dry mouth. If dry mouth persists despite humidity setting adjustment, may apply OTC Biotene gel over the gums at bedtime.  If Biotene gel is not effective, consider trying XEROSTOM gel from [employer].  Also, eliminate or reduce dose of meds that can cause dry mouth if possible. Lastly, may try getting a separate room humidifier machine.    Airleaks: Please call DME as they may need to adjust your mask or refit you with a different kind of mask. In addition, you can ask DME for tips on getting a good mask seal and mask fit.     Difficulty tolerating the mask: Contact your DME to try a  "different kind of mask and/or call office to get a referral to Sleep Psychologist for CPAP desensitization. CPAP desensitization technique is a set of strategies that helps patient cope with claustrophobia and anxiety related to wearing mask. Alternatively, we can do a daytime mini-sleep study called PAP-nap trial wherein you will try on different kinds of mask and the sleep technician will try different pressure settings on CPAP and BPAP machines to see which specific pressure is tolerable and comfortable for you.     Water droplets or moisture within the hose and/or mask: This is called rain-out and it is caused by condensation of too much heated humidity on the cooler walls of the hose. If you have rain-out, turn down humidity settings or get a heated hose. If you already have a heated hose, turn up the \"tube temperature\" of the heated hose. Alternatively, if you don't want to get a heated hose or warmer air, may wrap the CPAP hose with stockings to keep it somewhat warm. Also, you need to place the machine on the floor and lower the hose so that water won't travel upward towards your mask.       Maintaining your CPAP/BPAP device:    The humidification chamber (aka water tank or water chamber) needs to be filled with distilled water to prevent buildup of white deposits in the future. If you cannot find distilled water, you can use tap water but expect to have white deposits buildup seen after prolonged use with tap water. If you start seeing white deposits on the water chamber, you can clean it by filling it with equal parts of distilled white vinegar and water. Let the vinegar-water mixture sit for 2 hours, and then rinse it with running tap water. Clean with soap and water then let it dry.     You should try to keep your machine clean in order to work well. Here are some tips to clean PAP supplies / accessories:    Clean the humidification chamber (aka water tank) as well as your mask and tubing at least once a " week with soap and water.   Alternatively, you can fill a sink or basin with warm water and add a little mild detergent, like Ivory dish soap. Gently wipe your supplies with the soapy water to free all the oils and dirt that may have collected. Once that's done, rinse these items with clean water until the soap is gone and let them air dry. You can hang your tubing over the curtain fercho in your bathroom so that it dries.  The mask insert (part of the mask that has contact with your skin) needs to be cleaned with soap and water daily. Another option is to wipe them down with CPAP wipes or baby wipes.    You should replace your mask and tubing frequently in order to prevent bacteria buildup, machine damage, and mask seal issues. The older the mask and hose, the high likelihood that there is bacteria buildup in it especially if they are not cleaned regularly. Dirty filters damage machines because build-up of dust and contaminants can cause machine to over-heat, and in time, damage the motor of machine. Cushions lose their seal over time as most masks are made of plastic and silicone while headgear is made of neoprene. These materials will break down with age and frequent use. Here is the recommended replacement schedule for PAP supplies / accessories:    Twice a month- disposable filters and cushions for nasal mask or nasal pillows.  Once a month- cushion for full face mask  Every 3 months- mask with headgear and PAP tubing (standard or heated hose)  Every 6 months- reusable filter, water chamber, and chin strap     Other useful information:    Some people do not put water in the tank while other people prefers to put water in the tank to prevent mouth dryness. Try to experiment to determine which is more comfortable for you.   In general, new machines have 2 years warranty on parts while health insurance allows you to have a new machine once every 5 years.      IMPORTANT INFORMATION     Call 911 for medical  "emergencies.  Our offices are generally open from Monday-Friday, 9 am - 5 pm.  If you need to get in touch with me, you may either call me/my team (number is below) or you can use [facility].  If a referral for a test, for CPAP, or for another specialist was made, and you have not heard about scheduling this within a week, please call scheduling at [phone]).  If you are unable to make your appointment for clinic or an overnight study, kindly call the office at least 48 hours in advance to cancel and reschedule.  If you are on CPAP, please bring your device's card or the device to each clinic appointment.   There are no supporting services by either the sleep doctors or their staff on weekends and Holidays, or after 5 PM on weekdays.     PRESCRIPTIONS     We require 7 days advanced notice for prescription refills. If we do not receive the request in this time, we cannot guarantee that your medication will be refilled in time.      IMPORTANT PHONE NUMBERS     [facility]: [phone]  [employer] ([employer]): [phone]  [employer] (DME): [phone]  [facility] (DME): [phone]    CONTACTING YOUR SLEEP MEDICINE PROVIDER AND SLEEP TEAM      For issues with your machine or mask interface, please call your DME provider first. [employer] stands for durable medical company. [employer] is the company who provides you the machine and/or PAP supplies / accessories.   To schedule, cancel, or reschedule SLEEP STUDY APPOINTMENTS, please call the Main Phone Line at [phone] - option 3.   To schedule, cancel, or reschedule CLINIC APPOINTMENTS, you can do it in \"MyChart\", call [phone] for [facility] office , [phone] for [facility] office to speak with my on site staff, or call the Main Phone Line at [phone] - option 2  For CLINICAL QUESTIONS or MEDICATION REFILLS, please call direct line for Adult Sleep Nurses at [phone].   Lastly, you can also send a " "message directly to your provider through \"My Chart\", which is the email service through your  Records Account: [email or web address]     Adult Sleep Nurses (Lainey Uribe, RN and Terri Liang, RN):  For clinical questions and refilling prescriptions: [phone]  Email sleep diaries and other documents at: [email or web address]    Office locations for Vanessa Barcenas NP:    [facility]   [id number] [place] [address] [address]   [address]  [place], [place] [address] [phone]    [address]  [address]  [place], [place] [address] [phone]    [address] [facility] [facility]  [address]  [place], [place] [address] [phone]    OUR SLEEP TESTING LOCATIONS     Our team will contact you to schedule your sleep study, however, you can contact us as follow:  Main Phone Line (scheduling only): [phone]), option 3    Sleep Testing Locations:   [place] (18 years and older): [address] [place] [address], 2nd floor   [place] (18 years and older): [address] [facility]; 4th floor  After hours line: [phone]  [facility] (18 years and older) at [place]: [address] [place] [address]  After hours line: [phone]   [facility] (Main campus: All ages): [facility], 6th floor. After hours line: [phone]   [place] (5 years and older; younger considered on case-by-case basis): [address] [place] [address]; [employer] [facility], [address]. Scheduling  After hours line: [phone]       Here at [facility], we wish you a restful sleep!    Your sleep medicine provider for this visit was: Vanessa Barcenas, APRN-CNP   "

## 2024-12-10 ENCOUNTER — PATIENT MESSAGE (OUTPATIENT)
Dept: CARDIOLOGY | Facility: CLINIC | Age: 49
End: 2024-12-10
Payer: COMMERCIAL

## 2024-12-11 DIAGNOSIS — E66.01 MORBID OBESITY (MULTI): Primary | ICD-10-CM

## 2024-12-11 DIAGNOSIS — R73.03 BORDERLINE DIABETES MELLITUS: ICD-10-CM

## 2024-12-11 RX ORDER — TIRZEPATIDE 2.5 MG/.5ML
2.5 INJECTION, SOLUTION SUBCUTANEOUS
Qty: 4 ML | Refills: 3 | Status: SHIPPED | OUTPATIENT
Start: 2024-12-11

## 2025-04-02 ENCOUNTER — TELEPHONE (OUTPATIENT)
Dept: CARDIOLOGY | Facility: CLINIC | Age: 50
End: 2025-04-02
Payer: COMMERCIAL

## 2025-04-02 NOTE — TELEPHONE ENCOUNTER
Patient sent [employer] message-    I hope you are doing well.   We changed me from Wegovy to Zepbound towards the end of last year.   While [facility] has been more proactive in generating Zepbound refills, I was not paying attention to the fact that they were not increasing my dosages along the way.   I am still on the original 2.5mg and was hoping we can put in an order to increase my dosage to the next level (5mg).      Please feel free to call me at [phone] if you have any questions.

## 2025-04-03 ENCOUNTER — PATIENT MESSAGE (OUTPATIENT)
Dept: CARDIOLOGY | Facility: CLINIC | Age: 50
End: 2025-04-03
Payer: COMMERCIAL

## 2025-04-03 DIAGNOSIS — E66.01 MORBID OBESITY (MULTI): ICD-10-CM

## 2025-04-03 DIAGNOSIS — R73.03 BORDERLINE DIABETES MELLITUS: ICD-10-CM

## 2025-04-03 RX ORDER — TIRZEPATIDE 2.5 MG/.5ML
5 INJECTION, SOLUTION SUBCUTANEOUS
Qty: 4 ML | Refills: 3 | Status: SHIPPED | OUTPATIENT
Start: 2025-04-03 | End: 2025-04-03 | Stop reason: ALTCHOICE

## 2025-04-05 ENCOUNTER — APPOINTMENT (OUTPATIENT)
Dept: CARDIOLOGY | Facility: HOSPITAL | Age: 50
End: 2025-04-05
Payer: COMMERCIAL

## 2025-04-05 ENCOUNTER — APPOINTMENT (OUTPATIENT)
Dept: RADIOLOGY | Facility: HOSPITAL | Age: 50
End: 2025-04-05
Payer: COMMERCIAL

## 2025-04-05 ENCOUNTER — HOSPITAL ENCOUNTER (EMERGENCY)
Facility: HOSPITAL | Age: 50
Discharge: HOME | End: 2025-04-05
Attending: STUDENT IN AN ORGANIZED HEALTH CARE EDUCATION/TRAINING PROGRAM
Payer: COMMERCIAL

## 2025-04-05 ENCOUNTER — HOSPITAL ENCOUNTER (EMERGENCY)
Dept: CARDIOLOGY | Facility: HOSPITAL | Age: 50
Discharge: HOME | End: 2025-04-05
Payer: COMMERCIAL

## 2025-04-05 VITALS
OXYGEN SATURATION: 100 % | SYSTOLIC BLOOD PRESSURE: 175 MMHG | RESPIRATION RATE: 16 BRPM | BODY MASS INDEX: 43.4 KG/M2 | HEIGHT: 69 IN | DIASTOLIC BLOOD PRESSURE: 87 MMHG | TEMPERATURE: 98.2 F | WEIGHT: 293 LBS | HEART RATE: 74 BPM

## 2025-04-05 DIAGNOSIS — R09.1 PLEURISY: ICD-10-CM

## 2025-04-05 DIAGNOSIS — R07.9 CHEST PAIN, UNSPECIFIED TYPE: Primary | ICD-10-CM

## 2025-04-05 LAB
ALBUMIN SERPL BCP-MCNC: 3.9 G/DL (ref 3.4–5)
ALP SERPL-CCNC: 95 U/L (ref 33–110)
ALT SERPL W P-5'-P-CCNC: 10 U/L (ref 7–45)
ANION GAP SERPL CALC-SCNC: 11 MMOL/L (ref 10–20)
AST SERPL W P-5'-P-CCNC: 9 U/L (ref 9–39)
B-HCG SERPL-ACNC: <2 MIU/ML
BASOPHILS # BLD AUTO: 0.06 X10*3/UL (ref 0–0.1)
BASOPHILS NFR BLD AUTO: 0.8 %
BILIRUB SERPL-MCNC: 0.3 MG/DL (ref 0–1.2)
BNP SERPL-MCNC: 69 PG/ML (ref 0–99)
BUN SERPL-MCNC: 13 MG/DL (ref 6–23)
CALCIUM SERPL-MCNC: 8.9 MG/DL (ref 8.6–10.3)
CARDIAC TROPONIN I PNL SERPL HS: 4 NG/L (ref 0–13)
CARDIAC TROPONIN I PNL SERPL HS: 5 NG/L (ref 0–13)
CHLORIDE SERPL-SCNC: 106 MMOL/L (ref 98–107)
CO2 SERPL-SCNC: 25 MMOL/L (ref 21–32)
CREAT SERPL-MCNC: 0.73 MG/DL (ref 0.5–1.05)
D DIMER PPP FEU-MCNC: 408 NG/ML FEU
EGFRCR SERPLBLD CKD-EPI 2021: >90 ML/MIN/1.73M*2
EOSINOPHIL # BLD AUTO: 0.29 X10*3/UL (ref 0–0.7)
EOSINOPHIL NFR BLD AUTO: 4 %
ERYTHROCYTE [DISTWIDTH] IN BLOOD BY AUTOMATED COUNT: 15.9 % (ref 11.5–14.5)
FLUAV RNA RESP QL NAA+PROBE: NOT DETECTED
FLUBV RNA RESP QL NAA+PROBE: NOT DETECTED
GLUCOSE SERPL-MCNC: 92 MG/DL (ref 74–99)
HCT VFR BLD AUTO: 29.7 % (ref 36–46)
HGB BLD-MCNC: 8.8 G/DL (ref 12–16)
IMM GRANULOCYTES # BLD AUTO: 0.02 X10*3/UL (ref 0–0.7)
IMM GRANULOCYTES NFR BLD AUTO: 0.3 % (ref 0–0.9)
INR PPP: 1.1 (ref 0.9–1.1)
LIPASE SERPL-CCNC: 20 U/L (ref 9–82)
LYMPHOCYTES # BLD AUTO: 2.33 X10*3/UL (ref 1.2–4.8)
LYMPHOCYTES NFR BLD AUTO: 32.5 %
MAGNESIUM SERPL-MCNC: 2.06 MG/DL (ref 1.6–2.4)
MCH RBC QN AUTO: 22.6 PG (ref 26–34)
MCHC RBC AUTO-ENTMCNC: 29.6 G/DL (ref 32–36)
MCV RBC AUTO: 76 FL (ref 80–100)
MONOCYTES # BLD AUTO: 0.41 X10*3/UL (ref 0.1–1)
MONOCYTES NFR BLD AUTO: 5.7 %
NEUTROPHILS # BLD AUTO: 4.07 X10*3/UL (ref 1.2–7.7)
NEUTROPHILS NFR BLD AUTO: 56.7 %
NRBC BLD-RTO: 0 /100 WBCS (ref 0–0)
PLATELET # BLD AUTO: 332 X10*3/UL (ref 150–450)
POTASSIUM SERPL-SCNC: 3.8 MMOL/L (ref 3.5–5.3)
PROT SERPL-MCNC: 6.9 G/DL (ref 6.4–8.2)
PROTHROMBIN TIME: 11.8 SECONDS (ref 9.8–12.4)
RBC # BLD AUTO: 3.9 X10*6/UL (ref 4–5.2)
SARS-COV-2 RNA RESP QL NAA+PROBE: NOT DETECTED
SODIUM SERPL-SCNC: 138 MMOL/L (ref 136–145)
WBC # BLD AUTO: 7.2 X10*3/UL (ref 4.4–11.3)

## 2025-04-05 PROCEDURE — 99285 EMERGENCY DEPT VISIT HI MDM: CPT | Mod: 25 | Performed by: STUDENT IN AN ORGANIZED HEALTH CARE EDUCATION/TRAINING PROGRAM

## 2025-04-05 PROCEDURE — 85610 PROTHROMBIN TIME: CPT | Performed by: EMERGENCY MEDICINE

## 2025-04-05 PROCEDURE — 71045 X-RAY EXAM CHEST 1 VIEW: CPT

## 2025-04-05 PROCEDURE — 84484 ASSAY OF TROPONIN QUANT: CPT | Performed by: STUDENT IN AN ORGANIZED HEALTH CARE EDUCATION/TRAINING PROGRAM

## 2025-04-05 PROCEDURE — 2550000001 HC RX 255 CONTRASTS: Performed by: EMERGENCY MEDICINE

## 2025-04-05 PROCEDURE — 36415 COLL VENOUS BLD VENIPUNCTURE: CPT | Performed by: STUDENT IN AN ORGANIZED HEALTH CARE EDUCATION/TRAINING PROGRAM

## 2025-04-05 PROCEDURE — 93005 ELECTROCARDIOGRAM TRACING: CPT

## 2025-04-05 PROCEDURE — 71275 CT ANGIOGRAPHY CHEST: CPT | Mod: FOREIGN READ | Performed by: RADIOLOGY

## 2025-04-05 PROCEDURE — 87636 SARSCOV2 & INF A&B AMP PRB: CPT | Performed by: STUDENT IN AN ORGANIZED HEALTH CARE EDUCATION/TRAINING PROGRAM

## 2025-04-05 PROCEDURE — 80053 COMPREHEN METABOLIC PANEL: CPT | Performed by: STUDENT IN AN ORGANIZED HEALTH CARE EDUCATION/TRAINING PROGRAM

## 2025-04-05 PROCEDURE — 83690 ASSAY OF LIPASE: CPT | Performed by: STUDENT IN AN ORGANIZED HEALTH CARE EDUCATION/TRAINING PROGRAM

## 2025-04-05 PROCEDURE — 83735 ASSAY OF MAGNESIUM: CPT | Performed by: EMERGENCY MEDICINE

## 2025-04-05 PROCEDURE — 84702 CHORIONIC GONADOTROPIN TEST: CPT | Performed by: STUDENT IN AN ORGANIZED HEALTH CARE EDUCATION/TRAINING PROGRAM

## 2025-04-05 PROCEDURE — 2500000004 HC RX 250 GENERAL PHARMACY W/ HCPCS (ALT 636 FOR OP/ED): Performed by: EMERGENCY MEDICINE

## 2025-04-05 PROCEDURE — 83880 ASSAY OF NATRIURETIC PEPTIDE: CPT | Performed by: EMERGENCY MEDICINE

## 2025-04-05 PROCEDURE — 85025 COMPLETE CBC W/AUTO DIFF WBC: CPT | Performed by: STUDENT IN AN ORGANIZED HEALTH CARE EDUCATION/TRAINING PROGRAM

## 2025-04-05 PROCEDURE — 71275 CT ANGIOGRAPHY CHEST: CPT

## 2025-04-05 PROCEDURE — 85379 FIBRIN DEGRADATION QUANT: CPT | Performed by: EMERGENCY MEDICINE

## 2025-04-05 PROCEDURE — 71045 X-RAY EXAM CHEST 1 VIEW: CPT | Mod: FOREIGN READ | Performed by: RADIOLOGY

## 2025-04-05 PROCEDURE — 96374 THER/PROPH/DIAG INJ IV PUSH: CPT | Mod: 59

## 2025-04-05 PROCEDURE — 96375 TX/PRO/DX INJ NEW DRUG ADDON: CPT | Mod: 59

## 2025-04-05 PROCEDURE — 2500000005 HC RX 250 GENERAL PHARMACY W/O HCPCS: Performed by: EMERGENCY MEDICINE

## 2025-04-05 RX ORDER — LIDOCAINE 560 MG/1
1 PATCH PERCUTANEOUS; TOPICAL; TRANSDERMAL ONCE
Status: DISCONTINUED | OUTPATIENT
Start: 2025-04-05 | End: 2025-04-05 | Stop reason: HOSPADM

## 2025-04-05 RX ORDER — METHYLPREDNISOLONE 4 MG/1
TABLET ORAL
Qty: 21 TABLET | Refills: 0 | Status: SHIPPED | OUTPATIENT
Start: 2025-04-05

## 2025-04-05 RX ORDER — LIDOCAINE, MENTHOL 4; 1 G/100G; G/100G
1 PATCH TOPICAL DAILY PRN
Qty: 30 PATCH | Refills: 0 | Status: SHIPPED | OUTPATIENT
Start: 2025-04-05 | End: 2025-05-05

## 2025-04-05 RX ORDER — MELOXICAM 15 MG/1
15 TABLET ORAL DAILY
Qty: 5 TABLET | Refills: 0 | Status: SHIPPED | OUTPATIENT
Start: 2025-04-05 | End: 2025-04-10

## 2025-04-05 RX ORDER — KETOROLAC TROMETHAMINE 30 MG/ML
15 INJECTION, SOLUTION INTRAMUSCULAR; INTRAVENOUS ONCE
Status: COMPLETED | OUTPATIENT
Start: 2025-04-05 | End: 2025-04-05

## 2025-04-05 RX ADMIN — IOHEXOL 75 ML: 350 INJECTION, SOLUTION INTRAVENOUS at 15:00

## 2025-04-05 RX ADMIN — LIDOCAINE 4% 1 PATCH: 40 PATCH TOPICAL at 17:52

## 2025-04-05 RX ADMIN — KETOROLAC TROMETHAMINE 15 MG: 30 INJECTION, SOLUTION INTRAMUSCULAR at 17:52

## 2025-04-05 RX ADMIN — METHYLPREDNISOLONE SODIUM SUCCINATE 125 MG: 125 INJECTION, POWDER, FOR SOLUTION INTRAMUSCULAR; INTRAVENOUS at 17:52

## 2025-04-05 ASSESSMENT — LIFESTYLE VARIABLES
EVER HAD A DRINK FIRST THING IN THE MORNING TO STEADY YOUR NERVES TO GET RID OF A HANGOVER: NO
EVER FELT BAD OR GUILTY ABOUT YOUR DRINKING: NO
HAVE YOU EVER FELT YOU SHOULD CUT DOWN ON YOUR DRINKING: NO
TOTAL SCORE: 0
HAVE PEOPLE ANNOYED YOU BY CRITICIZING YOUR DRINKING: NO

## 2025-04-05 ASSESSMENT — PAIN DESCRIPTION - ONSET: ONSET: PROGRESSIVE

## 2025-04-05 ASSESSMENT — PAIN DESCRIPTION - DESCRIPTORS: DESCRIPTORS: PRESSURE

## 2025-04-05 ASSESSMENT — PAIN SCALES - GENERAL
PAINLEVEL_OUTOF10: 5 - MODERATE PAIN
PAINLEVEL_OUTOF10: 5 - MODERATE PAIN
PAINLEVEL_OUTOF10: 6

## 2025-04-05 ASSESSMENT — PAIN DESCRIPTION - FREQUENCY: FREQUENCY: CONSTANT/CONTINUOUS

## 2025-04-05 ASSESSMENT — HEART SCORE
ECG: NORMAL
HEART SCORE: 2
TROPONIN: LESS THAN OR EQUAL TO NORMAL LIMIT
HISTORY: SLIGHTLY SUSPICIOUS
RISK FACTORS: 1-2 RISK FACTORS
AGE: 45-64

## 2025-04-05 ASSESSMENT — PAIN DESCRIPTION - LOCATION: LOCATION: CHEST

## 2025-04-05 ASSESSMENT — COLUMBIA-SUICIDE SEVERITY RATING SCALE - C-SSRS
6. HAVE YOU EVER DONE ANYTHING, STARTED TO DO ANYTHING, OR PREPARED TO DO ANYTHING TO END YOUR LIFE?: NO
1. IN THE PAST MONTH, HAVE YOU WISHED YOU WERE DEAD OR WISHED YOU COULD GO TO SLEEP AND NOT WAKE UP?: NO
2. HAVE YOU ACTUALLY HAD ANY THOUGHTS OF KILLING YOURSELF?: NO

## 2025-04-05 ASSESSMENT — PAIN DESCRIPTION - ORIENTATION: ORIENTATION: LEFT

## 2025-04-05 ASSESSMENT — PAIN DESCRIPTION - PAIN TYPE: TYPE: ACUTE PAIN

## 2025-04-05 ASSESSMENT — PAIN - FUNCTIONAL ASSESSMENT: PAIN_FUNCTIONAL_ASSESSMENT: 0-10

## 2025-04-05 NOTE — ED PROVIDER NOTES
HPI   Chief Complaint   Patient presents with    Pain With Breathing     Pt states she woke up this morning with left sided chest pain. Worse when she takes a deep breath. Pt denies any fls. Pt denies any sweating/nausea. Pt states she's has a hx of afib and htn but on [facility]compl[facility] for the last 3 months         History provided by:  Patient    Chief Complaint   Patient presents with    Pain With Breathing     Pt states she woke up this morning with left sided chest pain. Worse when she takes a deep breath. Pt denies any fls. Pt denies any sweating/nausea. Pt states she's has a hx of afib and htn but on [facility] for the last 3 months       History of Present Illness:  Patricia Astudillo is a 49 y.o. female presents with left-sided chest pain that radiates to her left shoulder blade.  She woke up this way this morning.  No fever or chills.  No cough.  No shortness of breath.  No leg swelling.  Pain is worse with movement and deep breathing.  Patient is concerned she may have a blood clot.  She has a history of hypertension and atrial fibrillation but has not been on her blood pressure medications or her Eliquis for months.  No trauma or travel.  No sick contacts.  No treatment prior to arrival.      PMFSH:   As per HPI, otherwise nurses notes reviewed in EMR.    Past Medical History:   Past Medical History:   Diagnosis Date    Nonrheumatic mitral (valve) prolapse 08/15/2019    Mild mitral valve prolapse    Personal history of other diseases of the circulatory system     History of atrial fibrillation    Personal history of other diseases of the circulatory system 02/21/2021    History of mitral valve insufficiency    Personal history of other endocrine, nutritional and metabolic disease 06/26/2020    History of hypothyroidism    Unspecified pre-eclampsia, complicating childbirth ([facility]) 11/07/2019    Hypertension in pregnancy, preeclampsia, delivered      Past Surgical History:   Past Surgical History:   Procedure  Laterality Date    ADENOIDECTOMY  08/15/2019    Adenoidectomy    BREAST BIOPSY  08/12/2021    Biopsy Breast Percutaneous Needle Core    DILATION AND CURETTAGE OF UTERUS  08/12/2021    Dilation And Curettage      Family History: No family history on file.   Social History:    Social History     Tobacco Use    Smoking status: Former     Types: Cigarettes    Smokeless tobacco: Never   Substance Use Topics    Alcohol use: Yes     Comment: social    Drug use: Never     Allergies:   Allergies   Allergen Reactions    Tetanus Vaccines And Toxoid Other    Penicillins Unknown, Hives, Other and Rash     Unsure of reaction, had as a child     Unsure of reaction, had as a child     Current Outpatient Medications   Medication Instructions    apixaban (ELIQUIS) 5 mg, oral, 2 times daily    azelastine (Astelin) 137 mcg (0.1 %) nasal spray 2 sprays, 2 times daily    ferrous sulfate 325 mg, Daily with breakfast    lidocaine-menthol 4-1 % adhesive patch,medicated 1 patch, topical (top), Daily PRN, Apply to intact skin for up to 12 hours per day.  Max: 3 patches per application.  May cut patch to size. Use smallest effective amount for shortest effective treatment duration.    losartan (COZAAR) 100 mg, oral, Daily    meloxicam (MOBIC) 15 mg, oral, Daily    methylPREDNISolone (Medrol Dospak) 4 mg tablets Follow schedule on package instructions    metoprolol tartrate (LOPRESSOR) 50 mg, oral, 2 times daily    tirzepatide (weight loss) (ZEPBOUND) 5 mg, subcutaneous, Every 7 days    torsemide (DEMADEX) 5 mg, oral, Daily              Patient History   Past Medical History:   Diagnosis Date    Nonrheumatic mitral (valve) prolapse 08/15/2019    Mild mitral valve prolapse    Personal history of other diseases of the circulatory system     History of atrial fibrillation    Personal history of other diseases of the circulatory system 02/21/2021    History of mitral valve insufficiency    Personal history of other endocrine, nutritional and  "metabolic disease 06/26/2020    History of hypothyroidism    Unspecified pre-eclampsia, complicating childbirth ([facility]) 11/07/2019    Hypertension in pregnancy, preeclampsia, delivered     Past Surgical History:   Procedure Laterality Date    ADENOIDECTOMY  08/15/2019    Adenoidectomy    BREAST BIOPSY  08/12/2021    Biopsy Breast Percutaneous Needle Core    DILATION AND CURETTAGE OF UTERUS  08/12/2021    Dilation And Curettage     No family history on file.  Social History     Tobacco Use    Smoking status: Former     Types: Cigarettes    Smokeless tobacco: Never   Substance Use Topics    Alcohol use: Yes     Comment: social    Drug use: Never       Physical Exam   ED Triage Vitals [04/05/25 1219]   Temperature Heart Rate Respirations BP   36.8 °C (98.2 °F) 78 18 (!) 198/95      Pulse Ox Temp Source Heart Rate Source Patient Position   100 % Temporal Monitor Sitting      BP Location FiO2 (%)     Left arm --       Physical Exam  Physical Exam:    ED Triage Vitals [04/05/25 1219]   Temperature Heart Rate Respirations BP   36.8 °C (98.2 °F) 78 18 (!) 198/95      Pulse Ox Temp Source Heart Rate Source Patient Position   100 % Temporal Monitor Sitting      BP Location FiO2 (%)     Left arm --         Patient Vitals for the past 24 hrs:   BP Temp Temp src Pulse Resp SpO2 Height Weight   04/05/25 1600 155/86 -- -- 68 16 100 % -- --   04/05/25 1528 164/78 36.8 °C (98.2 °F) Temporal 68 16 100 % -- --   04/05/25 1400 -- -- -- 76 -- 100 % -- --   04/05/25 1341 (!) 186/91 -- -- 84 18 100 % -- --   04/05/25 1219 (!) 198/95 36.8 °C (98.2 °F) Temporal 78 18 100 % 1.753 m (5' 9\") 147 kg (325 lb)       Constitutional: Vital signs per nursing notes.  Well developed, well nourished.  No acute distress.    Psychiatric: alert and oriented to person, place, and time; no abnormalities of mood or affect; memory intact  Eyes: PERRL; conjunctivae and lids normal; EOMI  ENT: otoscopic exam of external canal and TM´s normal; nasal mucosa, " turbinates, and septum normal; mouth, tongue, and pharynx normal; pharynx without edema, exudate, or injection  Respiratory: normal respiratory effort and excursion; no rales, rhonchi, or wheezes; equal air entry  Cardiovascular: regular rate and rhythm; no murmurs, rubs or gallops; symmetric pulses; no edema; normal capillary refill; distal pulses present  Neurological: normal speech; CN II-XII grossly intact; normal motor and sensory function; no nystagmus; no pronator drift; normal finger to nose and heel to shin tests  GI: no masses, tenderness, rebound or guarding; no palpable, pulsatile mass; no organomegaly; no hernia; normal bowel sounds; (-) Carvalho´s sign; (-) McBurney´s sign; (-) CVA tenderness  Lymphatic: no adenopathy of neck  Musculoskeletal: normal gait and station; normal digits and nails; no gross tendon or ligament injury; normal to palpation; normal strength/tone; neurovascular status intact; (-) Walter´s sign; except mild tenderness palpation to the left posterior scapula  Skin: normal to inspection; normal to palpation; no rash  GCS: 15      ED Course & MDM   Diagnoses as of 04/05/25 1651   Chest pain, unspecified type   Pleurisy                 No data recorded     [place] Coma Scale Score: 15 (04/05/25 1220 : Qi Beltran RN) HEART Score: 2 (04/05/25 1457 : Justino PENA MD)                         Medical Decision Making  Medical Decision Making:    EKG: My interpretation of EKG is normal sinus rhythm at 82 bpm with no acute ST-T changes              My interpretation of second EKG is normal sinus rhythm at 66 bpm with no acute ST-T changes    Labs:   Labs Reviewed   CBC WITH AUTO DIFFERENTIAL - Abnormal       Result Value    WBC 7.2      nRBC 0.0      RBC 3.90 (*)     Hemoglobin 8.8 (*)     Hematocrit 29.7 (*)     MCV 76 (*)     MCH 22.6 (*)     MCHC 29.6 (*)     RDW 15.9 (*)     Platelets 332      Neutrophils % 56.7      Immature Granulocytes %, Automated 0.3      Lymphocytes % 32.5       Monocytes % 5.7      Eosinophils % 4.0      Basophils % 0.8      Neutrophils Absolute 4.07      Immature Granulocytes Absolute, Automated 0.02      Lymphocytes Absolute 2.33      Monocytes Absolute 0.41      Eosinophils Absolute 0.29      Basophils Absolute 0.06     COMPREHENSIVE METABOLIC PANEL - Normal    Glucose 92      Sodium 138      Potassium 3.8      Chloride 106      Bicarbonate 25      Anion Gap 11      Urea Nitrogen 13      Creatinine 0.73      eGFR >90      Calcium 8.9      Albumin 3.9      Alkaline Phosphatase 95      Total Protein 6.9      AST 9      Bilirubin, Total 0.3      ALT 10     LIPASE - Normal    Lipase 20      Narrative:     Venipuncture immediately after or during the administration of Metamizole may lead to falsely low results. Testing should be performed immediately prior to Metamizole dosing.   HUMAN CHORIONIC GONADOTROPIN, SERUM QUANTITATIVE - Normal    HCG, Beta-Quantitative <2      Narrative:      Total HCG measurement is performed using the [employer]   Immunoassay which detects intact HCG and free beta HCG subunit.    This test is not indicated for use as a tumor marker.   HCG testing is performed using a different test methodology at [facility] than other [facility]. Direct result comparison   should only be made within the same method.       SARS-COV-2 AND INFLUENZA A/B PCR - Normal    Flu A Result Not Detected      Flu B Result Not Detected      Coronavirus 2019, PCR Not Detected      Narrative:     This assay is an FDA-cleared, in vitro diagnostic nucleic acid amplification test for the qualitative detection and differentiation of SARS CoV-2/ Influenza A/B from nasopharyngeal specimens collected from individuals with signs and symptoms of respiratory tract infections, and has been validated for use at [facility]. Negative results do not preclude COVID-19/ Influenza A/B infections and should not be used as the sole basis  for diagnosis, treatment, or other management decisions. Testing for SARS CoV-2 is recommended only for patients who meet current clinical and/or epidemiological criteria defined by federal, state, or local public health directives.   SERIAL TROPONIN-INITIAL - Normal    Troponin I, High Sensitivity 4      Narrative:     Less than 99th percentile of normal range cutoff-  Female and children under 18 years old <14 ng/L; Male <21 ng/L: Negative  Repeat testing should be performed if clinically indicated.     Female and children under 18 years old 14-50 ng/L; Male 21-50 ng/L:  Consistent with possible cardiac damage and possible increased clinical   risk. Serial measurements may help to assess extent of myocardial damage.     >50 ng/L: Consistent with cardiac damage, increased clinical risk and  myocardial infarction. Serial measurements may help assess extent of   myocardial damage.      NOTE: Children less than 1 year old may have higher baseline troponin   levels and results should be interpreted in conjunction with the overall   clinical context.     NOTE: Troponin I testing is performed using a different   testing methodology at [facility] than at other   [facility]. Direct result comparisons should only   be made within the same method.   D-DIMER, VTE EXCLUSION - Normal    D-Dimer, Quantitative VTE Exclusion 408      Narrative:     The VTE Exclusion D-Dimer assay is reported in ng/mL Fibrinogen Equivalent Units (FEU).    Per 's instructions for use, a value of less than 500 ng/mL (FEU) may help to exclude DVT or PE in outpatients when the assay is used with a clinical pretest probability assessment.([facility] must utilize and document eCalc 'Wells Score Deep Vein Thrombosis Risk' for DVT exclusion only. Emergency Department should utilize  Guidelines for Emergency Department Use of the VTE Exclusion D-Dimer and Clinical Pretest probability assessment model for DVT or PE exclusion.)    MAGNESIUM - Normal    Magnesium 2.06     B-TYPE NATRIURETIC PEPTIDE - Normal    BNP 69      Narrative:        <100 pg/mL - Heart failure unlikely  100-299 pg/mL - Intermediate probability of acute heart                  failure exacerbation. Correlate with clinical                  context and patient history.    >=300 pg/mL - Heart Failure likely. Correlate with clinical                  context and patient history.    BNP testing is performed using different testing methodology at [facility] than at other system [facility]. Direct result comparisons should only be made within the same method.      PROTIME-INR - Normal    Protime 11.8      INR 1.1     SERIAL TROPONIN, 1 HOUR - Normal    Troponin I, High Sensitivity 5      Narrative:     Less than 99th percentile of normal range cutoff-  Female and children under 18 years old <14 ng/L; Male <21 ng/L: Negative  Repeat testing should be performed if clinically indicated.     Female and children under 18 years old 14-50 ng/L; Male 21-50 ng/L:  Consistent with possible cardiac damage and possible increased clinical   risk. Serial measurements may help to assess extent of myocardial damage.     >50 ng/L: Consistent with cardiac damage, increased clinical risk and  myocardial infarction. Serial measurements may help assess extent of   myocardial damage.      NOTE: Children less than 1 year old may have higher baseline troponin   levels and results should be interpreted in conjunction with the overall   clinical context.     NOTE: Troponin I testing is performed using a different   testing methodology at [facility] than at other   [facility]. Direct result comparisons should only   be made within the same method.   TROPONIN SERIES- (INITIAL, 1 HR)    Narrative:     The following orders were created for panel order Troponin I Series, High Sensitivity (0, 1 HR).  Procedure                               Abnormality         Status                      ---------                               -----------         ------                     Troponin I, High Sensiti...[[id number]]  Normal              Final result               Troponin, High Sensitivi...[[id number]]  Normal              Final result                 Please view results for these tests on the individual orders.       Diagnostic Imaging:   CT angio chest for pulmonary embolism   Final Result   1.Limited exam due to timing of contrast bolus. No central   pulmonary embolus demonstrated. Segmental or subsegmental emboli   cannot be reliably excluded.   2.There is a mosaic attenuation pattern of lung parenchyma,   suggestive of small to medium airway disease. Scattered areas of   air-trapping also noted.   Signed by Murali Valentino II, MD      XR chest 1 view   Final Result   Mild CHF.   Signed by Junaid Brock MD          ED Medication Administration:   Medications   ketorolac (Toradol) injection 15 mg (has no administration in time range)   methylPREDNISolone sod succinate (SOLU-Medrol) injection 125 mg (has no administration in time range)   lidocaine 4 % patch 1 patch (has no administration in time range)   iohexol (OMNIPaque) 350 mg iodine/mL solution 75 mL (75 mL intravenous Given 4/5/25 1500)       ED Course:     Patricia Astudillo is a 49 y.o. female presents with   chest pain.    Differential Diagnoses Considered:  ACS, arrhythmia, PE, pleurisy, pneumonia, muscle strain      Patient presents with chest pain.     Chest x-ray to evaluate for evidence of pneumonia/pneumothorax/CHF.     EKG/troponin to evaluate for evidence of arrhythmia/ACS/AMI.    Lab work to evaluate for evidence of anemia or significant electrolyte abnormality including hypokalemia, hyperkalemia, hyponatremia, hypernatremia, hyperglycemia or hypoglycemia.     D-dimer to evaluate for possible PE.     CT of the chest to evaluate for possible PE.    HEART score 2             Diagnoses as of 04/05/25 1651   Chest pain, unspecified  type   Pleurisy       Abnormal Labs Reviewed   CBC WITH AUTO DIFFERENTIAL - Abnormal; Notable for the following components:       Result Value    RBC 3.90 (*)     Hemoglobin 8.8 (*)     Hematocrit 29.7 (*)     MCV 76 (*)     MCH 22.6 (*)     MCHC 29.6 (*)     RDW 15.9 (*)     All other components within normal limits       /86 (04/05/25 1600)    Temp      Pulse 68 (04/05/25 1600)   Resp 16 (04/05/25 1600)    SpO2 100 % (04/05/25 1600)          Diagnostic evaluation was completed.  Initial troponin is normal.  Repeat troponin was also in the normal range.  D-dimer was in the normal range.  INR is 1.1.  Pregnancy is negative.  BNP is in the normal range so do not suspect CHF.  Lipase in the normal range so do not suspect pancreatitis.  Metabolic panel shows a normal glucose.  Sodium potassium in the normal range.  Renal and liver function are in the normal range.  Influenza and COVID are negative.  CBC shows a normal white blood cell count with mild anemia with a hemoglobin of 8.8.  Platelets are in the normal range.  Chest x-ray shows mild CHF.  CT of the chest shows no central pulmonary embolus demonstrated.  Segmental and subsegmental emboli cannot be reliably excluded.  There is most thick attenuation pattern of lung parenchyma, suggestive of small to medium airway disease.  Scattered areas of air trapping also noted.    Re-evaluation: Repeat evaluation at 4:47 PM shows the patient is feeling better.  Vital signs are stable.    Patient was treated with IV Toradol and IV Solu-Medrol, as well as topical lidocaine.    Medications administered improved the patient's condition.    No urgent or emergent conditions been identified.  The patient be discharged home with short-term follow-up with her primary care provider.    I discussed the results and discharge plan with the patient and/or family/friend if present.  I emphasized the importance of follow up with the physician I referred them to in the timeframe  recommended.  I explained reasons for the patient to return to the Emergency Department.  Questions were addressed.  The patient and/or family/friend expressed understanding.      Patient was instructed to have follow up with primary doctor or specialist within 1-3 days.      Shared decision making made with patient, and/or family, who agrees with plan.    Escalation of care considered:   I considered admission. However, given the improvement in symptoms and reassuring workup, patient is appropriate for discharge and outpatient care. Risk of hospitalization outweighs the benefits.     I do not suspect ACS/AMI/pulmonary embolus/aortic dissection.      No urgent or emergent conditions haven identified or are suspected at this time.    Patient is afebrile, resting comfortably, well hydrated, tolerating oral fluids, normal baseline neuro exam, lungs CTA, abdomen soft NTND, not requiring supplemental O2/supportive care/IV medications or IV fluids.     HEART SCORE is 2   .  This indicated outpatient risk stratification as patient has low risk for adverse cardiac event over the next month, do not suspect ACS at this time.  It was determined that this patient was at low risk for acute coronary syndrome.  A second set of cardiac enzymes and EKG were performed which did not reveal evidence of ACS.  Therefore, the patient will be discharged to home with close follow-up.  This was discussed with the patient and/or family/friends present.  Reasons to return were also discussed.    Shared Decision made with the patient who agrees with plan.  Short term cardiology referral made.      I utilized an evidence-based risk rating tool (CMT) along with my training and experience to weigh the risk of discharge against the risks of further testing, imaging, or hospitalization. At this time I estimate the risks of additional testing, imaging, or hospitalization to be equal to or greater than the risk of discharge. I discussed my risk  "assessment with the patient and the patient consents to the risk of discharge as well as the risk of uncertainty in estimating outcomes.    The patient's HEART Score is <4. In rare cases, I give patients with HEART Score of 4 the option of discharge, but only when they meet criteria for \"Low 4,\" meaning that HST was used, and the 4 is not from a highly suspicious story, highly suspicious EKG, or positive cardiac enzymes. In these selected cases, the risk of a \"Low 4\" is still most likely lower than the risk of admission and further testing/imaging. [id number]           Prescription Medication Consideration/Given:   ED Prescriptions       Medication Sig Dispense Start Date End Date Auth. Provider    meloxicam (Mobic) 15 mg tablet Take 1 tablet (15 mg) by mouth once daily for 5 days. 5 tablet 4/5/2025 4/10/2025 Justino PENA MD    lidocaine-menthol 4-1 % adhesive patch,medicated Apply 1 patch topically once daily as needed (pain). Apply to intact skin for up to 12 hours per day.  Max: 3 patches per application.  May cut patch to size. Use smallest effective amount for shortest effective treatment duration. 30 patch 4/5/2025 5/5/2025 Justino PENA MD    methylPREDNISolone (Medrol Dospak) 4 mg tablets Follow schedule on package instructions 21 tablet 4/5/2025 -- Justino PENA MD            Diagnosis:   1. Chest pain, unspecified type    2. Pleurisy              Procedure  Procedures     Justino PENA MD  04/05/25 8861    "

## 2025-04-06 LAB
ATRIAL RATE: 66 BPM
ATRIAL RATE: 82 BPM
P AXIS: 68 DEGREES
P AXIS: 83 DEGREES
P OFFSET: 154 MS
P OFFSET: 199 MS
P ONSET: 125 MS
P ONSET: 131 MS
PR INTERVAL: 172 MS
PR INTERVAL: 182 MS
Q ONSET: 216 MS
Q ONSET: 217 MS
QRS COUNT: 10 BEATS
QRS COUNT: 13 BEATS
QRS DURATION: 104 MS
QRS DURATION: 98 MS
QT INTERVAL: 394 MS
QT INTERVAL: 426 MS
QTC CALCULATION(BAZETT): 446 MS
QTC CALCULATION(BAZETT): 460 MS
QTC FREDERICIA: 437 MS
QTC FREDERICIA: 440 MS
R AXIS: 12 DEGREES
R AXIS: 87 DEGREES
T AXIS: 43 DEGREES
T AXIS: 61 DEGREES
T OFFSET: 414 MS
T OFFSET: 429 MS
VENTRICULAR RATE: 66 BPM
VENTRICULAR RATE: 82 BPM

## 2025-04-07 ENCOUNTER — PATIENT MESSAGE (OUTPATIENT)
Dept: CARDIOLOGY | Facility: CLINIC | Age: 50
End: 2025-04-07
Payer: COMMERCIAL

## 2025-04-09 DIAGNOSIS — Z86.39 HISTORY OF HYPOTHYROIDISM: Primary | ICD-10-CM

## 2025-04-10 DIAGNOSIS — E66.01 MORBID OBESITY (MULTI): ICD-10-CM

## 2025-04-10 DIAGNOSIS — R73.03 BORDERLINE DIABETES MELLITUS: ICD-10-CM

## 2025-04-11 ENCOUNTER — HOSPITAL ENCOUNTER (OUTPATIENT)
Dept: CARDIOLOGY | Facility: CLINIC | Age: 50
Discharge: HOME | End: 2025-04-11
Payer: COMMERCIAL

## 2025-04-11 ENCOUNTER — OFFICE VISIT (OUTPATIENT)
Dept: CARDIOLOGY | Facility: CLINIC | Age: 50
End: 2025-04-11
Payer: COMMERCIAL

## 2025-04-11 ENCOUNTER — APPOINTMENT (OUTPATIENT)
Dept: CARDIOLOGY | Facility: CLINIC | Age: 50
End: 2025-04-11
Payer: COMMERCIAL

## 2025-04-11 VITALS
HEART RATE: 63 BPM | WEIGHT: 293 LBS | HEIGHT: 69 IN | OXYGEN SATURATION: 97 % | DIASTOLIC BLOOD PRESSURE: 76 MMHG | SYSTOLIC BLOOD PRESSURE: 128 MMHG | BODY MASS INDEX: 43.4 KG/M2

## 2025-04-11 DIAGNOSIS — I87.2 VENOUS INSUFFICIENCY: ICD-10-CM

## 2025-04-11 DIAGNOSIS — I34.0 NONRHEUMATIC MITRAL VALVE REGURGITATION: ICD-10-CM

## 2025-04-11 DIAGNOSIS — R06.09 EXERTIONAL DYSPNEA: ICD-10-CM

## 2025-04-11 DIAGNOSIS — I10 PRIMARY HYPERTENSION: ICD-10-CM

## 2025-04-11 DIAGNOSIS — I48.91 ATRIAL FIBRILLATION WITH RAPID VENTRICULAR RESPONSE (MULTI): ICD-10-CM

## 2025-04-11 DIAGNOSIS — R07.89 ATYPICAL CHEST PAIN: Primary | ICD-10-CM

## 2025-04-11 DIAGNOSIS — E66.01 MORBID OBESITY (MULTI): ICD-10-CM

## 2025-04-11 DIAGNOSIS — I34.0 NONRHEUMATIC MITRAL VALVE REGURGITATION: Primary | ICD-10-CM

## 2025-04-11 DIAGNOSIS — R07.89 ATYPICAL CHEST PAIN: ICD-10-CM

## 2025-04-11 DIAGNOSIS — I48.0 PAROXYSMAL ATRIAL FIBRILLATION (MULTI): ICD-10-CM

## 2025-04-11 DIAGNOSIS — R73.03 BORDERLINE DIABETES MELLITUS: ICD-10-CM

## 2025-04-11 PROBLEM — M25.559 HIP PAIN: Status: ACTIVE | Noted: 2025-04-11

## 2025-04-11 LAB — BODY SURFACE AREA: 2.68 M2

## 2025-04-11 PROCEDURE — 3074F SYST BP LT 130 MM HG: CPT | Performed by: INTERNAL MEDICINE

## 2025-04-11 PROCEDURE — 99214 OFFICE O/P EST MOD 30 MIN: CPT | Mod: 25 | Performed by: INTERNAL MEDICINE

## 2025-04-11 PROCEDURE — 3008F BODY MASS INDEX DOCD: CPT | Performed by: INTERNAL MEDICINE

## 2025-04-11 PROCEDURE — 3078F DIAST BP <80 MM HG: CPT | Performed by: INTERNAL MEDICINE

## 2025-04-11 PROCEDURE — 93308 TTE F-UP OR LMTD: CPT

## 2025-04-11 PROCEDURE — 99214 OFFICE O/P EST MOD 30 MIN: CPT | Performed by: INTERNAL MEDICINE

## 2025-04-11 RX ORDER — METOPROLOL TARTRATE 50 MG/1
50 TABLET ORAL 2 TIMES DAILY
Qty: 180 TABLET | Refills: 3 | Status: SHIPPED | OUTPATIENT
Start: 2025-04-11 | End: 2026-04-11

## 2025-04-11 RX ORDER — TORSEMIDE 5 MG/1
5 TABLET ORAL DAILY
Qty: 90 TABLET | Refills: 3 | Status: SHIPPED | OUTPATIENT
Start: 2025-04-11 | End: 2026-04-11

## 2025-04-11 RX ORDER — LOSARTAN POTASSIUM 100 MG/1
100 TABLET ORAL DAILY
Qty: 90 TABLET | Refills: 3 | Status: SHIPPED | OUTPATIENT
Start: 2025-04-11 | End: 2026-04-11

## 2025-04-11 NOTE — PROGRESS NOTES
Subjective   Patricia Astudillo is a 49 y.o. female.    Chief Complaint:  Emergency room follow-up for chest pain.  Follow-up paroxysmal atrial fibrillation.    HPI    She was seen in the emergency room for chest pain.  She described pleuritic pain in the left lateral chest and in the back.  She was concerned over the possibility of pulmonary embolism because she had not been very compliant with her Eliquis.  Her workup in the hospital was negative.  Checks x-ray reported to show mild congestion not seen on the CT scan.  She was given meloxicam and this resolved her symptoms.    She presented to [facility] on June 17, 2024 with atrial fibrillation with a rapid ventricular response.  She was given IV Cardizem and spontaneously converted to sinus rhythm.       An echocardiographic study performed on June 17, 2024 showed normal left ventricular function with left atrial enlargement and mitral valve prolapse.     Her cardiac history is significant for the presence of mitral regurgitation and mitral valve prolapse. She's also had a past history of an episode of atrial fibrillation. She's been on beta blocker therapy.      Past medical history: Significant for obstructive sleep apnea.  History of hypothyroidism.  History of arthritis.     Allergies  Medication    · Penicillins     Family History  Mother    · Family history of congestive heart failure (V17.49) (Z82.49)  Father    · Family history of Coronary arteriosclerosis   · Family history of hypertension (V17.49) (Z82.49)     Social History  Problems    · Former smoker (V15.82) (Z87.891)   ·    · Rarely consumes alcohol (V49.89) (Z78.9)     Review of Systems   Constitutional: Positive for malaise/fatigue.   Cardiovascular:  Positive for dyspnea on exertion and palpitations.  Lower extremity edema.  Chest pain.  Musculoskeletal:  Positive for arthritis.   All other systems reviewed and are negative.    Current Outpatient Medications   Medication Sig Dispense  "Refill    apixaban (Eliquis) 5 mg tablet Take 1 tablet (5 mg) by mouth 2 times a day. 180 tablet 3    azelastine (Astelin) 137 mcg (0.1 %) nasal spray Administer 2 sprays into each nostril 2 times a day.      ferrous sulfate, 325 mg ferrous sulfate, tablet Take 1 tablet (325 mg) by mouth once daily with breakfast. Takes twice a day.      lidocaine-menthol 4-1 % adhesive patch,medicated Apply 1 patch topically once daily as needed (pain). Apply to intact skin for up to 12 hours per day.  Max: 3 patches per application.  May cut patch to size. Use smallest effective amount for shortest effective treatment duration. 30 patch 0    losartan (Cozaar) 100 mg tablet Take 1 tablet (100 mg) by mouth once daily. 90 tablet 3    metoprolol tartrate (Lopressor) 50 mg tablet Take 1 tablet by mouth 2 times a day. 180 tablet 3    tirzepatide, weight loss, (Zepbound) 2.5 mg/0.5 mL injection Inject 2.5 mg under the skin every 7 days. 4 each 3    torsemide (Demadex) 5 mg tablet Take 1 tablet (5 mg) by mouth once daily. (Patient taking differently: Take 1 tablet (5 mg) by mouth every other day.) 30 tablet 11     No current facility-administered medications for this visit.        Visit Vitals  /76   Pulse 63   Ht 1.753 m (5' 9\")   Wt 147 kg (324 lb)   SpO2 97%   BMI 47.85 kg/m²   Smoking Status Former   BSA 2.68 m²        Objective     Constitutional:       Appearance: Not in distress.   Neck:      Vascular: JVD normal.   Pulmonary:      Breath sounds: Normal breath sounds.   Cardiovascular:      Normal rate. Regular rhythm. Normal S1. Normal S2.       Murmurs: There is no murmur.      No gallop.    Pulses:     Intact distal pulses.   Edema:     Peripheral edema absent.   Abdominal:      General: There is no distension.      Palpations: Abdomen is soft.   Neurological:      Mental Status: Alert.         Lab Review:   Lab Results   Component Value Date     04/05/2025    K 3.8 04/05/2025     04/05/2025    CO2 25 " 04/05/2025    BUN 13 04/05/2025    CREATININE 0.73 04/05/2025    GLUCOSE 92 04/05/2025    CALCIUM 8.9 04/05/2025     Lab Results   Component Value Date    WBC 7.2 04/05/2025    HGB 8.8 (L) 04/05/2025    HCT 29.7 (L) 04/05/2025    MCV 76 (L) 04/05/2025     04/05/2025       Assessment:    1.  Paroxysmal atrial fibrillation.  Maintaining sinus rhythm on current medications.  I believe there is a high probability that she will end up going back into atrial fibrillation so we will continue anticoagulation therapy.    2.  Atypical chest pain.  We personally reviewed the CT of the chest.  This shows no evidence of atherosclerosis in the distribution of the coronary arteries or in the distribution of the aorta.    3.  Morbid obesity.  We have made arrangements for her to start on Zepbound.    4.  Lower extremity edema.  Generalized lower extremity edema.  On diuretic therapy.  Labs reviewed and are normal.  Renal function is normal.    5.  Moderate mitral regurgitation.  Today's echo study shows mitral valve prolapse with moderate mitral regurgitation.  This is a complex mitral regurgitant jet similar to what was seen in 2023.  At this point she does not require intervention on her mitral valve.

## 2025-04-14 LAB
AORTIC VALVE MEAN GRADIENT: 4 MMHG
AORTIC VALVE PEAK VELOCITY: 1.38 M/S
AV PEAK GRADIENT: 8 MMHG
EJECTION FRACTION APICAL 4 CHAMBER: 65
EJECTION FRACTION: 68 %
RIGHT VENTRICLE FREE WALL PEAK S': 0.15 CM/S
RIGHT VENTRICLE PEAK SYSTOLIC PRESSURE: 37.1 MMHG

## 2025-04-16 ENCOUNTER — APPOINTMENT (OUTPATIENT)
Dept: CARDIOLOGY | Facility: CLINIC | Age: 50
End: 2025-04-16
Payer: COMMERCIAL

## 2025-04-22 ENCOUNTER — APPOINTMENT (OUTPATIENT)
Dept: PRIMARY CARE | Facility: CLINIC | Age: 50
End: 2025-04-22
Payer: COMMERCIAL

## 2025-04-23 DIAGNOSIS — E66.01 MORBID OBESITY (MULTI): ICD-10-CM

## 2025-04-23 DIAGNOSIS — I10 PRIMARY HYPERTENSION: ICD-10-CM

## 2025-04-23 DIAGNOSIS — R73.03 BORDERLINE DIABETES MELLITUS: ICD-10-CM

## 2025-04-30 LAB
T3FREE SERPL-MCNC: 2.9 PG/ML (ref 2.3–4.2)
T4 FREE SERPL-MCNC: 1 NG/DL (ref 0.8–1.8)
THYROPEROXIDASE AB SERPL-ACNC: 116 IU/ML
TSH SERPL-ACNC: 2.08 MIU/L

## 2025-05-05 ENCOUNTER — APPOINTMENT (OUTPATIENT)
Dept: PRIMARY CARE | Facility: CLINIC | Age: 50
End: 2025-05-05
Payer: COMMERCIAL

## 2025-05-08 ENCOUNTER — APPOINTMENT (OUTPATIENT)
Dept: ENDOCRINOLOGY | Facility: CLINIC | Age: 50
End: 2025-05-08
Payer: COMMERCIAL

## 2025-05-08 VITALS
BODY MASS INDEX: 43.4 KG/M2 | DIASTOLIC BLOOD PRESSURE: 76 MMHG | SYSTOLIC BLOOD PRESSURE: 124 MMHG | WEIGHT: 293 LBS | HEIGHT: 69 IN

## 2025-05-08 DIAGNOSIS — E06.3 HASHIMOTO'S DISEASE: ICD-10-CM

## 2025-05-08 DIAGNOSIS — Z86.39 HISTORY OF HYPOTHYROIDISM: Primary | ICD-10-CM

## 2025-05-08 PROCEDURE — 3078F DIAST BP <80 MM HG: CPT | Performed by: HOSPITALIST

## 2025-05-08 PROCEDURE — 3074F SYST BP LT 130 MM HG: CPT | Performed by: HOSPITALIST

## 2025-05-08 PROCEDURE — 99214 OFFICE O/P EST MOD 30 MIN: CPT | Performed by: HOSPITALIST

## 2025-05-08 PROCEDURE — 3008F BODY MASS INDEX DOCD: CPT | Performed by: HOSPITALIST

## 2025-05-08 ASSESSMENT — ENCOUNTER SYMPTOMS
AGITATION: 0
NAUSEA: 0
CONSTITUTIONAL NEGATIVE: 1
DYSURIA: 0
DIARRHEA: 0
NERVOUS/ANXIOUS: 0
EYE ITCHING: 0
FREQUENCY: 0
PHOTOPHOBIA: 0
TREMORS: 0
ARTHRALGIAS: 0
VOMITING: 0
SHORTNESS OF BREATH: 0
SORE THROAT: 0
ABDOMINAL DISTENTION: 0
ABDOMINAL PAIN: 0
CONSTIPATION: 0
SLEEP DISTURBANCE: 0
VOICE CHANGE: 0
CHEST TIGHTNESS: 0
LIGHT-HEADEDNESS: 0
HEADACHES: 0
PALPITATIONS: 0
TROUBLE SWALLOWING: 0

## 2025-05-08 NOTE — PROGRESS NOTES
Subjective   Patient ID: Patricia Astudillo is a 49 y.o. female who presents for annual exam and checkup and to Establish Care.  HPI  Annual exam and checkup and to establish  Patient presents in the office today to establish care. Patient was seeing Dr. Aisha Coppola. Patient does not go to Dr. Coppola anymore due to not having a connection with her. Patient heard about the practice through her  who comes here.      Patient has a cardiac history significant for mitral regurgitation and mitral valve prolapse. She also had a past history of atrial fibrillation, no recent episodes. Currently on Metoprolol. Last Echo on 4/11/2025 was normal. Repeat /74 today. Patient also admits to having swelling. Patient uses CPAP and saw sleep medicine a few months ago.     Patient is currently on tirzepatide 5 mg for weight loss. Notes constipation with the medication.    Patient would like discuss going into perimenopausal. She reports having heavy periods. Due for a Pap test.    Medication list was reviewed with the patient, and refills provided.    Patient quit smoking several years ago.     Tdap vaccine up-to-date.      Review of systems  ; Patient seen today for exam denies any problems with headaches or vision, denies any shortness of breath chest pain nausea or vomiting, no black stool no blood in the stool no heartburn type symptoms denies any problems with constipation or diarrhea, and no dysuria-type symptoms    The patient's allergies medications were reviewed with them today    The patient's social family and surgical history or also reviewed here today, along with her past medical history.     Objective     Alert and active in  no acute distress  HEENT TMs clear oropharynx negative nares clear no drainage noted neck supple  With no adenopathy   Heart regular rate and rhythm without murmur and no carotid bruits  Lungs- clear to auscultation bilaterally, no wheeze or rhonchi noted  Thyroid -negative masses or  "nodularity  Abdomen- soft times four quadrants , bowel sounds positive no masses or organomegaly, negative tenderness guarding or rebound    skin -no lesions noted      /80 (BP Location: Right arm, Patient Position: Sitting, BP Cuff Size: Adult)   Pulse 55   Temp 36.6 °C (97.8 °F) (Temporal)   Ht 1.727 m (5' 8\")   Wt (!) 151 kg (332 lb)   SpO2 99%   BMI 50.48 kg/m²     Allergies[1]    Assessment/Plan   Problem List Items Addressed This Visit       HTN (hypertension)    History of hypothyroidism    Borderline diabetes mellitus    SUSANNA (obstructive sleep apnea)    Paroxysmal atrial fibrillation (Multi)    Relevant Orders    Referral to Cardiology     Other Visit Diagnoses         Routine general medical examination at a health care facility    -  Primary      Screening for colon cancer        Relevant Orders    Cologuard® colon cancer screening      Screening mammogram for breast cancer        Relevant Orders    BI mammo bilateral screening tomosynthesis      Encounter to establish care          BMI 50.0-59.9, adult (Multi)          Class 3 severe obesity due to excess calories with serious comorbidity and body mass index (BMI) of 50.0 to 59.9 in adult          MVP (mitral valve prolapse)          Postmenopausal        Relevant Orders    Referral to Gynecology            Patient is overall doing well with the current medication regimen and will continue with the medications.    Continue tirzepatide 5 mg weekly.  Will obtain sleep study to rule out possible obstructive sleep apnea with a plan to have the Zepbound covered by her insurance.    Increase fiber intake, eat more apple and less banana, drink plenty of fluid, and take stool softener daily to prevent constipation.    Referral to cardiology Dr. Shahid Cabrera ordered today for managing Afib.   Stay sway from [facility] to prevent Afib.    Referral to gynecologist Dr. Carlton Duarte ordered today for evaluation of her heavy menstrual periods.    Mammogram " ordered today.     Cologuard ordered today.  If Cologuard comes out positive, we will move forward with colonoscopy. If negative, will hold colonoscopy for 3 years.    If anything worsens or changes please call us at once, follow up in the office as planned,       Scribe Attestation  By signing my name below, I, Selene Gallo   attest that this documentation has been prepared under the direction and in the presence of Murali Erickson DO.    All medical record entries made by the Beckyibe were at my direction and personally dictated by me.   I have reviewed the chart and agree that the record accurately reflects my personal performance of the history, physical exam, discussion, and plan.         [1]   Allergies  Allergen Reactions    Penicillins Unknown, Hives, Other and Rash     Unsure of reaction, had as a child     Unsure of reaction, had as a child

## 2025-05-08 NOTE — PROGRESS NOTES
Subjective   Patient ID: Patricia Astudillo is a 48 y.o. female who presents for Hypothyroidism ( thyroid. Dx: 2018 /Not taking any medication at this time/[facility]: mother /).   Lab Results   Component Value Date    TSH 2.08 04/29/2025      HPI   See AP     Review of Systems   Constitutional: Negative.    HENT:  Negative for sore throat, trouble swallowing and voice change.    Eyes:  Negative for photophobia, itching and visual disturbance.   Respiratory:  Negative for chest tightness and shortness of breath.    Cardiovascular:  Negative for chest pain and palpitations.   Gastrointestinal:  Negative for abdominal distention, abdominal pain, constipation, diarrhea, nausea and vomiting.   Endocrine: Negative for cold intolerance, heat intolerance and polyuria.   Genitourinary:  Negative for dysuria and frequency.   Musculoskeletal:  Negative for arthralgias.   Skin:  Negative for pallor.   Allergic/Immunologic: Negative for environmental allergies.   Neurological:  Negative for tremors, light-headedness and headaches.   Psychiatric/Behavioral:  Negative for agitation and sleep disturbance. The patient is not nervous/anxious.        Objective   Physical Exam  Constitutional:       Appearance: Normal appearance.   HENT:      Head: Normocephalic.      Nose: Nose normal.      Mouth/Throat:      Mouth: Mucous membranes are moist.   Eyes:      Extraocular Movements: Extraocular movements intact.   Cardiovascular:      Rate and Rhythm: Normal rate.   Pulmonary:      Effort: Pulmonary effort is normal. No respiratory distress.   Abdominal:      General: There is no distension.   Musculoskeletal:         General: Normal range of motion.      Cervical back: Normal range of motion and neck supple.   Skin:     General: Skin is warm and dry.   Neurological:      Mental Status: She is alert and oriented to person, place, and time.   Psychiatric:         Mood and Affect: Mood normal.        Assessment/Plan   Diagnoses and all orders for  this visit:  History of hypothyroidism  Morbid obesity (Multi)         48 y/o F with Hashimoto's thyroiditis on replacement. Pt had twin girls on 10/26/19.- [place]      she has the twins via IVF and while getting workup she was found to have hypothyroidism and started on the levothyroxine.    She has not been taking any levothyroxine early 2024 and feels the same   Does c/o fatigue     5/ 2024 TSH 2.63 TPO positive in past  11/7/2024 TSH 1.60  /20 9/2025 TSH 2.08 Free T4 was 1.0 Free T3 was 2.9, TPO antibody 116   clinically and biochemically euthyroid   Denies biotin use    Interval history: Regular but heavy menstruation   Zepbound- dec 2024      PLAN:    NO MORE LEVOTHYROXINE at this time.  Discussed symptoms of hypothyroidism.  Discussed in Hashimoto's thyroid disease we will have to monitor her symptoms and blood work   she mentions she has no plan for pregnancy at this time   TFT in 6 months    # Vitamin D deficiency: ergocalciferol 50,000 once weekly.       # Morbid obesity : currently on Zepbound      RTC  6m       SH- twin girls 2019 : carlin - 4 yo   she works mon- thurs at [employer]      - major in [employer] physics ,   Mom- hypothyroidism

## 2025-05-09 ENCOUNTER — APPOINTMENT (OUTPATIENT)
Dept: PRIMARY CARE | Facility: CLINIC | Age: 50
End: 2025-05-09
Payer: COMMERCIAL

## 2025-05-09 VITALS
BODY MASS INDEX: 44.41 KG/M2 | HEART RATE: 55 BPM | SYSTOLIC BLOOD PRESSURE: 126 MMHG | OXYGEN SATURATION: 99 % | DIASTOLIC BLOOD PRESSURE: 80 MMHG | HEIGHT: 68 IN | TEMPERATURE: 97.8 F | WEIGHT: 293 LBS

## 2025-05-09 DIAGNOSIS — I48.0 PAROXYSMAL ATRIAL FIBRILLATION (MULTI): ICD-10-CM

## 2025-05-09 DIAGNOSIS — I10 PRIMARY HYPERTENSION: ICD-10-CM

## 2025-05-09 DIAGNOSIS — E66.813 CLASS 3 SEVERE OBESITY DUE TO EXCESS CALORIES WITH SERIOUS COMORBIDITY AND BODY MASS INDEX (BMI) OF 50.0 TO 59.9 IN ADULT: ICD-10-CM

## 2025-05-09 DIAGNOSIS — R73.03 BORDERLINE DIABETES MELLITUS: ICD-10-CM

## 2025-05-09 DIAGNOSIS — Z12.11 SCREENING FOR COLON CANCER: ICD-10-CM

## 2025-05-09 DIAGNOSIS — Z00.00 ROUTINE GENERAL MEDICAL EXAMINATION AT A HEALTH CARE FACILITY: Primary | ICD-10-CM

## 2025-05-09 DIAGNOSIS — Z76.89 ENCOUNTER TO ESTABLISH CARE: ICD-10-CM

## 2025-05-09 DIAGNOSIS — I34.1 MVP (MITRAL VALVE PROLAPSE): ICD-10-CM

## 2025-05-09 DIAGNOSIS — G47.33 OSA (OBSTRUCTIVE SLEEP APNEA): ICD-10-CM

## 2025-05-09 DIAGNOSIS — Z78.0 POSTMENOPAUSAL: ICD-10-CM

## 2025-05-09 DIAGNOSIS — Z12.31 SCREENING MAMMOGRAM FOR BREAST CANCER: ICD-10-CM

## 2025-05-09 DIAGNOSIS — Z86.39 HISTORY OF HYPOTHYROIDISM: ICD-10-CM

## 2025-05-09 PROCEDURE — 99396 PREV VISIT EST AGE 40-64: CPT | Performed by: FAMILY MEDICINE

## 2025-05-09 PROCEDURE — 3079F DIAST BP 80-89 MM HG: CPT | Performed by: FAMILY MEDICINE

## 2025-05-09 PROCEDURE — 3074F SYST BP LT 130 MM HG: CPT | Performed by: FAMILY MEDICINE

## 2025-05-09 PROCEDURE — 3008F BODY MASS INDEX DOCD: CPT | Performed by: FAMILY MEDICINE

## 2025-05-09 PROCEDURE — 1036F TOBACCO NON-USER: CPT | Performed by: FAMILY MEDICINE

## 2025-05-22 ENCOUNTER — APPOINTMENT (OUTPATIENT)
Dept: CARDIOLOGY | Facility: CLINIC | Age: 50
End: 2025-05-22
Payer: COMMERCIAL

## 2025-05-23 DIAGNOSIS — R73.03 BORDERLINE DIABETES MELLITUS: ICD-10-CM

## 2025-05-23 DIAGNOSIS — E66.01 MORBID OBESITY (MULTI): ICD-10-CM

## 2025-05-29 RX ORDER — TIRZEPATIDE 5 MG/.5ML
5 INJECTION, SOLUTION SUBCUTANEOUS
Qty: 2 ML | Refills: 3 | Status: SHIPPED | OUTPATIENT
Start: 2025-05-29

## 2025-06-23 ENCOUNTER — APPOINTMENT (OUTPATIENT)
Dept: OBSTETRICS AND GYNECOLOGY | Facility: CLINIC | Age: 50
End: 2025-06-23
Payer: COMMERCIAL

## 2025-06-24 DIAGNOSIS — R73.03 BORDERLINE DIABETES MELLITUS: ICD-10-CM

## 2025-06-24 DIAGNOSIS — E66.01 MORBID OBESITY (MULTI): ICD-10-CM

## 2025-06-24 RX ORDER — TIRZEPATIDE 7.5 MG/.5ML
7.5 INJECTION, SOLUTION SUBCUTANEOUS
COMMUNITY
End: 2025-06-24 | Stop reason: SDUPTHER

## 2025-06-24 RX ORDER — TIRZEPATIDE 7.5 MG/.5ML
7.5 INJECTION, SOLUTION SUBCUTANEOUS
Qty: 2 ML | Refills: 3 | Status: SHIPPED | OUTPATIENT
Start: 2025-06-24

## 2025-11-13 ENCOUNTER — APPOINTMENT (OUTPATIENT)
Dept: ENDOCRINOLOGY | Facility: CLINIC | Age: 50
End: 2025-11-13
Payer: COMMERCIAL

## 2026-05-14 ENCOUNTER — APPOINTMENT (OUTPATIENT)
Dept: ENDOCRINOLOGY | Facility: CLINIC | Age: 51
End: 2026-05-14
Payer: COMMERCIAL